# Patient Record
Sex: FEMALE | Race: BLACK OR AFRICAN AMERICAN | Employment: FULL TIME
[De-identification: names, ages, dates, MRNs, and addresses within clinical notes are randomized per-mention and may not be internally consistent; named-entity substitution may affect disease eponyms.]

---

## 2024-09-05 ENCOUNTER — OFFICE VISIT (OUTPATIENT)
Dept: INTERNAL MEDICINE CLINIC | Facility: CLINIC | Age: 44
End: 2024-09-05
Payer: COMMERCIAL

## 2024-09-05 VITALS
BODY MASS INDEX: 50.02 KG/M2 | TEMPERATURE: 97.6 F | SYSTOLIC BLOOD PRESSURE: 140 MMHG | OXYGEN SATURATION: 100 % | HEIGHT: 63 IN | HEART RATE: 74 BPM | WEIGHT: 282.3 LBS | DIASTOLIC BLOOD PRESSURE: 100 MMHG

## 2024-09-05 DIAGNOSIS — R06.83 SNORING: ICD-10-CM

## 2024-09-05 DIAGNOSIS — G43.909 MIGRAINE WITHOUT STATUS MIGRAINOSUS, NOT INTRACTABLE, UNSPECIFIED MIGRAINE TYPE: ICD-10-CM

## 2024-09-05 DIAGNOSIS — E66.01 CLASS 3 SEVERE OBESITY DUE TO EXCESS CALORIES WITH SERIOUS COMORBIDITY AND BODY MASS INDEX (BMI) OF 50.0 TO 59.9 IN ADULT (HCC): ICD-10-CM

## 2024-09-05 DIAGNOSIS — I10 PRIMARY HYPERTENSION: ICD-10-CM

## 2024-09-05 DIAGNOSIS — H53.8 BLURRED VISION: ICD-10-CM

## 2024-09-05 DIAGNOSIS — Z76.89 ENCOUNTER TO ESTABLISH CARE: Primary | ICD-10-CM

## 2024-09-05 PROCEDURE — 3078F DIAST BP <80 MM HG: CPT | Performed by: NURSE PRACTITIONER

## 2024-09-05 PROCEDURE — 99204 OFFICE O/P NEW MOD 45 MIN: CPT | Performed by: NURSE PRACTITIONER

## 2024-09-05 PROCEDURE — 3077F SYST BP >= 140 MM HG: CPT | Performed by: NURSE PRACTITIONER

## 2024-09-05 RX ORDER — ATENOLOL 25 MG/1
25 TABLET ORAL DAILY
Qty: 90 TABLET | Refills: 0 | Status: SHIPPED | OUTPATIENT
Start: 2024-09-05

## 2024-09-05 RX ORDER — BUTALBITAL, ACETAMINOPHEN AND CAFFEINE 50; 325; 40 MG/1; MG/1; MG/1
1 TABLET ORAL EVERY 4 HOURS PRN
Qty: 30 TABLET | Refills: 0 | Status: SHIPPED | OUTPATIENT
Start: 2024-09-05

## 2024-09-05 SDOH — ECONOMIC STABILITY: FOOD INSECURITY: WITHIN THE PAST 12 MONTHS, YOU WORRIED THAT YOUR FOOD WOULD RUN OUT BEFORE YOU GOT MONEY TO BUY MORE.: NEVER TRUE

## 2024-09-05 SDOH — ECONOMIC STABILITY: INCOME INSECURITY: HOW HARD IS IT FOR YOU TO PAY FOR THE VERY BASICS LIKE FOOD, HOUSING, MEDICAL CARE, AND HEATING?: NOT VERY HARD

## 2024-09-05 SDOH — ECONOMIC STABILITY: FOOD INSECURITY: WITHIN THE PAST 12 MONTHS, THE FOOD YOU BOUGHT JUST DIDN'T LAST AND YOU DIDN'T HAVE MONEY TO GET MORE.: NEVER TRUE

## 2024-09-05 ASSESSMENT — PATIENT HEALTH QUESTIONNAIRE - PHQ9
SUM OF ALL RESPONSES TO PHQ QUESTIONS 1-9: 0
1. LITTLE INTEREST OR PLEASURE IN DOING THINGS: NOT AT ALL
SUM OF ALL RESPONSES TO PHQ QUESTIONS 1-9: 0
SUM OF ALL RESPONSES TO PHQ9 QUESTIONS 1 & 2: 0
SUM OF ALL RESPONSES TO PHQ QUESTIONS 1-9: 0
2. FEELING DOWN, DEPRESSED OR HOPELESS: NOT AT ALL
SUM OF ALL RESPONSES TO PHQ QUESTIONS 1-9: 0

## 2024-09-05 ASSESSMENT — ENCOUNTER SYMPTOMS
EYE PAIN: 0
VOMITING: 0
COUGH: 0
NAUSEA: 0
SORE THROAT: 0
SHORTNESS OF BREATH: 0
SINUS PAIN: 0
ABDOMINAL PAIN: 0
BACK PAIN: 0
RHINORRHEA: 0
CONSTIPATION: 0
DIARRHEA: 0

## 2024-09-05 NOTE — PROGRESS NOTES
Management     Referral Priority:   Routine     Referral Type:   Eval and Treat     Referral Reason:   Specialty Services Required     Number of Visits Requested:   1    [facility]     Referral Priority:   Routine     Referral Type:   Eval and Treat     Referral Reason:   Specialty Services Required     Referral Location:   [facility]     Requested Specialty:   Ophthalmology     Number of Visits Requested:   1                  Follow Up  Return in about 4 days (around 9/9/2024), or if symptoms worsen or fail to improve, for Physical with fasting labs.             Riana Moore DNP, FNP-BC

## 2024-09-09 ENCOUNTER — OFFICE VISIT (OUTPATIENT)
Dept: INTERNAL MEDICINE CLINIC | Facility: CLINIC | Age: 44
End: 2024-09-09
Payer: COMMERCIAL

## 2024-09-09 VITALS
SYSTOLIC BLOOD PRESSURE: 134 MMHG | HEIGHT: 63 IN | WEIGHT: 280 LBS | DIASTOLIC BLOOD PRESSURE: 89 MMHG | BODY MASS INDEX: 49.61 KG/M2 | HEART RATE: 87 BPM | OXYGEN SATURATION: 100 % | TEMPERATURE: 98.2 F

## 2024-09-09 DIAGNOSIS — J30.9 ALLERGIC RHINITIS, UNSPECIFIED SEASONALITY, UNSPECIFIED TRIGGER: ICD-10-CM

## 2024-09-09 DIAGNOSIS — Z00.00 ROUTINE GENERAL MEDICAL EXAMINATION AT A HEALTH CARE FACILITY: Primary | ICD-10-CM

## 2024-09-09 DIAGNOSIS — I10 PRIMARY HYPERTENSION: ICD-10-CM

## 2024-09-09 DIAGNOSIS — Z12.31 ENCOUNTER FOR SCREENING MAMMOGRAM FOR MALIGNANT NEOPLASM OF BREAST: ICD-10-CM

## 2024-09-09 DIAGNOSIS — G43.909 MIGRAINE WITHOUT STATUS MIGRAINOSUS, NOT INTRACTABLE, UNSPECIFIED MIGRAINE TYPE: ICD-10-CM

## 2024-09-09 DIAGNOSIS — E66.01 CLASS 3 SEVERE OBESITY DUE TO EXCESS CALORIES WITH SERIOUS COMORBIDITY AND BODY MASS INDEX (BMI) OF 45.0 TO 49.9 IN ADULT (HCC): ICD-10-CM

## 2024-09-09 LAB
ALBUMIN SERPL-MCNC: 3.5 G/DL (ref 3.5–5)
ALBUMIN/GLOB SERPL: 0.8 (ref 1–1.9)
ALP SERPL-CCNC: 100 U/L (ref 35–104)
ALT SERPL-CCNC: 13 U/L (ref 12–65)
ANION GAP SERPL CALC-SCNC: 10 MMOL/L (ref 9–18)
APPEARANCE UR: CLEAR
AST SERPL-CCNC: 22 U/L (ref 15–37)
BASOPHILS # BLD: 0 K/UL (ref 0–0.2)
BASOPHILS NFR BLD: 1 % (ref 0–2)
BILIRUB SERPL-MCNC: 0.5 MG/DL (ref 0–1.2)
BILIRUB UR QL: NEGATIVE
BUN SERPL-MCNC: 4 MG/DL (ref 6–23)
CALCIUM SERPL-MCNC: 9.3 MG/DL (ref 8.8–10.2)
CHLORIDE SERPL-SCNC: 102 MMOL/L (ref 98–107)
CHOLEST SERPL-MCNC: 175 MG/DL (ref 0–200)
CO2 SERPL-SCNC: 26 MMOL/L (ref 20–28)
COLOR UR: NORMAL
CREAT SERPL-MCNC: 0.6 MG/DL (ref 0.6–1.1)
DIFFERENTIAL METHOD BLD: ABNORMAL
EOSINOPHIL # BLD: 0.1 K/UL (ref 0–0.8)
EOSINOPHIL NFR BLD: 2 % (ref 0.5–7.8)
ERYTHROCYTE [DISTWIDTH] IN BLOOD BY AUTOMATED COUNT: 19.3 % (ref 11.9–14.6)
GLOBULIN SER CALC-MCNC: 4.2 G/DL (ref 2.3–3.5)
GLUCOSE SERPL-MCNC: 92 MG/DL (ref 70–99)
GLUCOSE UR STRIP.AUTO-MCNC: NEGATIVE MG/DL
HCT VFR BLD AUTO: 31.1 % (ref 35.8–46.3)
HDLC SERPL-MCNC: 45 MG/DL (ref 40–60)
HDLC SERPL: 3.9 (ref 0–5)
HGB BLD-MCNC: 8.4 G/DL (ref 11.7–15.4)
HGB UR QL STRIP: NEGATIVE
IMM GRANULOCYTES # BLD AUTO: 0 K/UL (ref 0–0.5)
IMM GRANULOCYTES NFR BLD AUTO: 0 % (ref 0–5)
KETONES UR QL STRIP.AUTO: NEGATIVE MG/DL
LDLC SERPL CALC-MCNC: 112 MG/DL (ref 0–100)
LEUKOCYTE ESTERASE UR QL STRIP.AUTO: NEGATIVE
LYMPHOCYTES # BLD: 3.8 K/UL (ref 0.5–4.6)
LYMPHOCYTES NFR BLD: 47 % (ref 13–44)
MCH RBC QN AUTO: 18.7 PG (ref 26.1–32.9)
MCHC RBC AUTO-ENTMCNC: 27 G/DL (ref 31.4–35)
MCV RBC AUTO: 69.3 FL (ref 82–102)
MONOCYTES # BLD: 0.5 K/UL (ref 0.1–1.3)
MONOCYTES NFR BLD: 6 % (ref 4–12)
NEUTS SEG # BLD: 3.6 K/UL (ref 1.7–8.2)
NEUTS SEG NFR BLD: 45 % (ref 43–78)
NITRITE UR QL STRIP.AUTO: NEGATIVE
NRBC # BLD: 0 K/UL (ref 0–0.2)
PH UR STRIP: 6.5 (ref 5–9)
PLATELET # BLD AUTO: 433 K/UL (ref 150–450)
PMV BLD AUTO: 9.4 FL (ref 9.4–12.3)
POTASSIUM SERPL-SCNC: 3.8 MMOL/L (ref 3.5–5.1)
PROT SERPL-MCNC: 7.7 G/DL (ref 6.3–8.2)
PROT UR STRIP-MCNC: NEGATIVE MG/DL
RBC # BLD AUTO: 4.49 M/UL (ref 4.05–5.2)
SODIUM SERPL-SCNC: 138 MMOL/L (ref 136–145)
SP GR UR REFRACTOMETRY: 1.01 (ref 1–1.02)
TRIGL SERPL-MCNC: 94 MG/DL (ref 0–150)
TSH, 3RD GENERATION: 2.98 UIU/ML (ref 0.27–4.2)
UROBILINOGEN UR QL STRIP.AUTO: 0.2 EU/DL (ref 0.2–1)
VLDLC SERPL CALC-MCNC: 19 MG/DL (ref 6–23)
WBC # BLD AUTO: 8 K/UL (ref 4.3–11.1)

## 2024-09-09 PROCEDURE — 3079F DIAST BP 80-89 MM HG: CPT | Performed by: NURSE PRACTITIONER

## 2024-09-09 PROCEDURE — 99396 PREV VISIT EST AGE 40-64: CPT | Performed by: NURSE PRACTITIONER

## 2024-09-09 PROCEDURE — 3075F SYST BP GE 130 - 139MM HG: CPT | Performed by: NURSE PRACTITIONER

## 2024-09-09 RX ORDER — FLUTICASONE PROPIONATE 50 MCG
1 SPRAY, SUSPENSION (ML) NASAL DAILY PRN
Qty: 16 G | Refills: 2 | Status: SHIPPED | OUTPATIENT
Start: 2024-09-09

## 2024-09-09 ASSESSMENT — ENCOUNTER SYMPTOMS
SORE THROAT: 0
ABDOMINAL PAIN: 0
RHINORRHEA: 0
COUGH: 0
CONSTIPATION: 0
BACK PAIN: 0
EYE PAIN: 0
VOMITING: 0
DIARRHEA: 0
SHORTNESS OF BREATH: 0
SINUS PAIN: 0
NAUSEA: 0

## 2024-09-10 DIAGNOSIS — D64.9 ANEMIA, UNSPECIFIED TYPE: Primary | ICD-10-CM

## 2024-09-10 RX ORDER — DOCUSATE SODIUM 100 MG/1
100 CAPSULE, LIQUID FILLED ORAL DAILY PRN
Qty: 90 CAPSULE | Refills: 1 | Status: SHIPPED | OUTPATIENT
Start: 2024-09-10

## 2024-09-10 RX ORDER — FERROUS SULFATE 325(65) MG
325 TABLET ORAL DAILY
Qty: 90 TABLET | Refills: 1 | Status: SHIPPED | OUTPATIENT
Start: 2024-09-10

## 2024-09-17 ENCOUNTER — TELEPHONE (OUTPATIENT)
Dept: INTERNAL MEDICINE CLINIC | Facility: CLINIC | Age: 44
End: 2024-09-17

## 2024-10-09 ENCOUNTER — OFFICE VISIT (OUTPATIENT)
Dept: INTERNAL MEDICINE CLINIC | Facility: CLINIC | Age: 44
End: 2024-10-09
Payer: COMMERCIAL

## 2024-10-09 VITALS
SYSTOLIC BLOOD PRESSURE: 132 MMHG | HEIGHT: 63 IN | BODY MASS INDEX: 49.2 KG/M2 | HEART RATE: 98 BPM | OXYGEN SATURATION: 100 % | WEIGHT: 277.7 LBS | TEMPERATURE: 97.3 F | DIASTOLIC BLOOD PRESSURE: 87 MMHG

## 2024-10-09 DIAGNOSIS — G43.909 MIGRAINE WITHOUT STATUS MIGRAINOSUS, NOT INTRACTABLE, UNSPECIFIED MIGRAINE TYPE: Primary | ICD-10-CM

## 2024-10-09 DIAGNOSIS — R11.0 NAUSEA: ICD-10-CM

## 2024-10-09 PROCEDURE — 99214 OFFICE O/P EST MOD 30 MIN: CPT | Performed by: NURSE PRACTITIONER

## 2024-10-09 PROCEDURE — 3075F SYST BP GE 130 - 139MM HG: CPT | Performed by: NURSE PRACTITIONER

## 2024-10-09 PROCEDURE — 3079F DIAST BP 80-89 MM HG: CPT | Performed by: NURSE PRACTITIONER

## 2024-10-09 RX ORDER — SUMATRIPTAN 50 MG/1
50 TABLET, FILM COATED ORAL DAILY PRN
Qty: 10 TABLET | Refills: 0 | Status: SHIPPED | OUTPATIENT
Start: 2024-10-09

## 2024-10-09 RX ORDER — ONDANSETRON 4 MG/1
4-8 TABLET, ORALLY DISINTEGRATING ORAL 3 TIMES DAILY PRN
COMMUNITY
Start: 2024-10-02 | End: 2024-10-09 | Stop reason: SDUPTHER

## 2024-10-09 RX ORDER — ONDANSETRON 4 MG/1
4-8 TABLET, ORALLY DISINTEGRATING ORAL EVERY 8 HOURS PRN
Qty: 30 TABLET | Refills: 1 | Status: SHIPPED | OUTPATIENT
Start: 2024-10-09

## 2024-10-09 RX ORDER — ERENUMAB-AOOE 70 MG/ML
70 INJECTION SUBCUTANEOUS
Qty: 1 ADJUSTABLE DOSE PRE-FILLED PEN SYRINGE | Refills: 2 | Status: SHIPPED | OUTPATIENT
Start: 2024-10-09

## 2024-10-09 RX ORDER — BUTALBITAL, ACETAMINOPHEN AND CAFFEINE 50; 325; 40 MG/1; MG/1; MG/1
1 TABLET ORAL EVERY 4 HOURS PRN
Qty: 30 TABLET | Refills: 0 | Status: SHIPPED | OUTPATIENT
Start: 2024-10-09

## 2024-10-09 RX ORDER — ACETAMINOPHEN AND CODEINE PHOSPHATE 300; 30 MG/1; MG/1
1-2 TABLET ORAL EVERY 6 HOURS PRN
COMMUNITY
Start: 2024-10-02

## 2024-10-09 ASSESSMENT — ENCOUNTER SYMPTOMS
ABDOMINAL PAIN: 0
COUGH: 0
CONSTIPATION: 0
SINUS PAIN: 0
SHORTNESS OF BREATH: 0
BACK PAIN: 0
DIARRHEA: 0
EYE PAIN: 0
SORE THROAT: 0
RHINORRHEA: 0
NAUSEA: 1
VOMITING: 0

## 2024-10-09 NOTE — PROGRESS NOTES
[facility]  [address]  [facility] [address]  Tel# [phone]  Fax# [phone]     Riana Moore DNP, FNP-BC  Family Nurse Practitioner            Date of Visit: 10/09/2024     Joanne May (: 1980) is a 44 y.o. female  established patient, here for evaluation of the following chief complaint(s):    Chief Complaint   Patient presents with    Migraine     Patient is still c/o migraines. Patient states they have been more intense since 24.         Patient Care Team:  Riana Moore APRN - CNP as PCP - General (Nurse Practitioner Family)  Riana Moore APRN - CNP as PCP - Empaneled Provider         History of Present Illness        Presents here for migraine follow up/urgent care follow up.          Migraines  Chronic  Started years ago around , about 14 years ago.     Had stopped going to [employer] in .  States she was working from home in  to .  Moved to [place] 2022 to Aug 2, 2022.  Moved to [place] Aug 3, 2024.     For the last 4 years, gets migraine headache once a month, duration 4 hours, resolved with ibuprofen.     Started having headache frequency since 2024 -- 2-3x a month, lasting 3-4 days, takes ibuprofen.  Denies going to an urgent care.      Noticed blurred vision since 2024. Denies going to an eye doctor or urgent care.      Would like to have [facility] for work re: migraines, blurred vision in the future. States she lives in [place], [place]  And still works at [place], [place], driving 2.5-3 hrs to/from work.          10/9/2024  Pt was seen at an urgent care on 10/2/2024 - [facility].  Pt states that Tylenol with Codeine helps with her headache, but makes her drowsy.    Pt states \"the headache does not go away\". Describes as \"manageable dull pain\".  Pt states on 2024, she was in her drive way in her car. Pt states she still a house in [place] (until 2024).  States \"everything went black\"

## 2024-11-06 ENCOUNTER — OFFICE VISIT (OUTPATIENT)
Dept: INTERNAL MEDICINE CLINIC | Facility: CLINIC | Age: 44
End: 2024-11-06
Payer: COMMERCIAL

## 2024-11-06 VITALS
DIASTOLIC BLOOD PRESSURE: 84 MMHG | OXYGEN SATURATION: 100 % | SYSTOLIC BLOOD PRESSURE: 137 MMHG | BODY MASS INDEX: 49.24 KG/M2 | TEMPERATURE: 98 F | WEIGHT: 277.9 LBS | HEART RATE: 88 BPM | HEIGHT: 63 IN

## 2024-11-06 DIAGNOSIS — E66.01 OBESITY, CLASS III, BMI 40-49.9 (MORBID OBESITY): ICD-10-CM

## 2024-11-06 DIAGNOSIS — R11.0 NAUSEA: ICD-10-CM

## 2024-11-06 DIAGNOSIS — R23.2 HOT FLASHES: ICD-10-CM

## 2024-11-06 DIAGNOSIS — G43.909 MIGRAINE WITHOUT STATUS MIGRAINOSUS, NOT INTRACTABLE, UNSPECIFIED MIGRAINE TYPE: Primary | ICD-10-CM

## 2024-11-06 DIAGNOSIS — N92.0 MENORRHAGIA WITH REGULAR CYCLE: ICD-10-CM

## 2024-11-06 DIAGNOSIS — I10 PRIMARY HYPERTENSION: ICD-10-CM

## 2024-11-06 PROCEDURE — 3079F DIAST BP 80-89 MM HG: CPT | Performed by: NURSE PRACTITIONER

## 2024-11-06 PROCEDURE — 99214 OFFICE O/P EST MOD 30 MIN: CPT | Performed by: NURSE PRACTITIONER

## 2024-11-06 PROCEDURE — 3075F SYST BP GE 130 - 139MM HG: CPT | Performed by: NURSE PRACTITIONER

## 2024-11-06 RX ORDER — BUTALBITAL, ACETAMINOPHEN AND CAFFEINE 50; 325; 40 MG/1; MG/1; MG/1
1 TABLET ORAL EVERY 4 HOURS PRN
Qty: 30 TABLET | Refills: 0 | Status: SHIPPED | OUTPATIENT
Start: 2024-11-06

## 2024-11-06 RX ORDER — ONDANSETRON 4 MG/1
4-8 TABLET, ORALLY DISINTEGRATING ORAL EVERY 8 HOURS PRN
Qty: 30 TABLET | Refills: 1 | Status: SHIPPED | OUTPATIENT
Start: 2024-11-06

## 2024-11-06 RX ORDER — TOPIRAMATE 25 MG/1
25 TABLET, FILM COATED ORAL NIGHTLY
Qty: 7 TABLET | Refills: 0 | Status: SHIPPED | OUTPATIENT
Start: 2024-11-06 | End: 2024-11-13

## 2024-11-06 RX ORDER — ATENOLOL 25 MG/1
25 TABLET ORAL DAILY
Qty: 90 TABLET | Refills: 0 | Status: SHIPPED | OUTPATIENT
Start: 2024-11-06

## 2024-11-06 RX ORDER — SUMATRIPTAN 50 MG/1
50 TABLET, FILM COATED ORAL DAILY PRN
Qty: 10 TABLET | Refills: 0 | Status: SHIPPED | OUTPATIENT
Start: 2024-11-06

## 2024-11-06 RX ORDER — TOPIRAMATE 25 MG/1
25 TABLET, FILM COATED ORAL 2 TIMES DAILY
Qty: 60 TABLET | Refills: 0 | Status: SHIPPED | OUTPATIENT
Start: 2024-11-14 | End: 2024-12-14

## 2024-11-06 ASSESSMENT — ENCOUNTER SYMPTOMS
EYE PAIN: 0
BACK PAIN: 0
SHORTNESS OF BREATH: 0
DIARRHEA: 0
SORE THROAT: 0
COUGH: 0
SINUS PAIN: 0
ABDOMINAL PAIN: 0
VOMITING: 0
NAUSEA: 1
CONSTIPATION: 0
RHINORRHEA: 0

## 2024-11-06 NOTE — PROGRESS NOTES
sounds: Normal breath sounds.   Abdominal:      General: Bowel sounds are normal.      Palpations: Abdomen is soft.   Musculoskeletal:         General: Normal range of motion.      Cervical back: Neck supple.   Skin:     General: Skin is warm and dry.   Neurological:      General: No focal deficit present.      Mental Status: She is alert and oriented to person, place, and time.   Psychiatric:         Mood and Affect: Mood normal.         Thought Content: Thought content normal.              Assessment/Plan:          ICD-10-CM    1. Migraine without status migrainosus, not intractable, unspecified migraine type  G43.909 atenolol (TENORMIN) 25 MG tablet     butalbital-acetaminophen-caffeine (FIORICET, ESGIC) -40 MG per tablet     SUMAtriptan (IMITREX) 50 MG tablet     topiramate (TOPAMAX) 25 MG tablet     topiramate (TOPAMAX) 25 MG tablet      2. Primary hypertension  I10 atenolol (TENORMIN) 25 MG tablet      3. Nausea  R11.0 ondansetron (ZOFRAN-ODT) 4 MG disintegrating tablet      4. Hot flashes  R23.2 [facility]      5. Menorrhagia with regular cycle  N92.0 [facility]      6. Obesity, Class III, BMI 40-49.9 (morbid obesity)  E66.01                Joanne was seen today for migraine.    Diagnoses and all orders for this visit:    Migraine without status migrainosus, not intractable, unspecified migraine type  -     atenolol (TENORMIN) 25 MG tablet; Take 1 tablet by mouth daily  -     butalbital-acetaminophen-caffeine (FIORICET, ESGIC) -40 MG per tablet; Take 1 tablet by mouth every 4 hours as needed for Headaches or Migraine  -     SUMAtriptan (IMITREX) 50 MG tablet; Take 1 tablet by mouth daily as needed for Migraine  -     topiramate (TOPAMAX) 25 MG tablet; Take 1 tablet by mouth nightly for 7 days  -     topiramate (TOPAMAX) 25 MG tablet; Take 1 tablet by mouth 2 times daily    Primary hypertension  -     atenolol (TENORMIN) 25 MG

## 2024-11-08 ENCOUNTER — TELEPHONE (OUTPATIENT)
Dept: INTERNAL MEDICINE CLINIC | Facility: CLINIC | Age: 44
End: 2024-11-08

## 2024-11-08 NOTE — TELEPHONE ENCOUNTER
The [facility] sent a fax to request the office notes from the patient's 11-6-24 visit.  Notes were faxed 11-8-24 to [phone]

## 2024-11-19 DIAGNOSIS — D64.9 ANEMIA, UNSPECIFIED TYPE: ICD-10-CM

## 2024-11-19 DIAGNOSIS — G43.909 MIGRAINE WITHOUT STATUS MIGRAINOSUS, NOT INTRACTABLE, UNSPECIFIED MIGRAINE TYPE: ICD-10-CM

## 2024-11-19 DIAGNOSIS — J30.9 ALLERGIC RHINITIS, UNSPECIFIED SEASONALITY, UNSPECIFIED TRIGGER: ICD-10-CM

## 2024-11-19 RX ORDER — FLUTICASONE PROPIONATE 50 MCG
1 SPRAY, SUSPENSION (ML) NASAL DAILY PRN
Qty: 16 G | Refills: 2 | OUTPATIENT
Start: 2024-11-19

## 2024-11-22 ENCOUNTER — OFFICE VISIT (OUTPATIENT)
Dept: ONCOLOGY | Age: 44
End: 2024-11-22
Payer: COMMERCIAL

## 2024-11-22 ENCOUNTER — HOSPITAL ENCOUNTER (OUTPATIENT)
Dept: LAB | Age: 44
Discharge: HOME | End: 2024-11-22
Payer: COMMERCIAL

## 2024-11-22 VITALS
DIASTOLIC BLOOD PRESSURE: 89 MMHG | TEMPERATURE: 98.1 F | HEART RATE: 92 BPM | BODY MASS INDEX: 48.73 KG/M2 | HEIGHT: 63 IN | SYSTOLIC BLOOD PRESSURE: 132 MMHG | WEIGHT: 275 LBS | RESPIRATION RATE: 17 BRPM | OXYGEN SATURATION: 96 %

## 2024-11-22 DIAGNOSIS — D64.9 ANEMIA, UNSPECIFIED TYPE: ICD-10-CM

## 2024-11-22 DIAGNOSIS — D64.9 ANEMIA, UNSPECIFIED TYPE: Primary | ICD-10-CM

## 2024-11-22 LAB
ALBUMIN SERPL-MCNC: 3.7 G/DL (ref 3.5–5)
ALBUMIN/GLOB SERPL: 0.7 (ref 1–1.9)
ALP SERPL-CCNC: 122 U/L (ref 35–104)
ALT SERPL-CCNC: 13 U/L (ref 8–45)
ANION GAP SERPL CALC-SCNC: 10 MMOL/L (ref 7–16)
AST SERPL-CCNC: 19 U/L (ref 15–37)
BASOPHILS # BLD: 0 K/UL (ref 0–0.2)
BASOPHILS NFR BLD: 0 % (ref 0–2)
BILIRUB SERPL-MCNC: 0.4 MG/DL (ref 0–1.2)
BUN SERPL-MCNC: 4 MG/DL (ref 6–23)
CALCIUM SERPL-MCNC: 9.2 MG/DL (ref 8.8–10.2)
CHLORIDE SERPL-SCNC: 103 MMOL/L (ref 98–107)
CO2 SERPL-SCNC: 24 MMOL/L (ref 20–29)
CREAT SERPL-MCNC: 0.63 MG/DL (ref 0.6–1.1)
DIFFERENTIAL METHOD BLD: ABNORMAL
EOSINOPHIL # BLD: 0.2 K/UL (ref 0–0.8)
EOSINOPHIL NFR BLD: 2 % (ref 0.5–7.8)
ERYTHROCYTE [DISTWIDTH] IN BLOOD BY AUTOMATED COUNT: 19.1 % (ref 11.9–14.6)
FERRITIN SERPL-MCNC: 13 NG/ML (ref 8–388)
FOLATE SERPL-MCNC: 10.9 NG/ML (ref 3.1–17.5)
GLOBULIN SER CALC-MCNC: 5 G/DL (ref 2.3–3.5)
GLUCOSE SERPL-MCNC: 104 MG/DL (ref 70–99)
HCT VFR BLD AUTO: 34.2 % (ref 35.8–46.3)
HGB BLD-MCNC: 9.7 G/DL (ref 11.7–15.4)
HGB RETIC QN AUTO: 21 PG (ref 29–35)
IMM GRANULOCYTES # BLD AUTO: 0 K/UL (ref 0–0.5)
IMM GRANULOCYTES NFR BLD AUTO: 0 % (ref 0–5)
IMM RETICS NFR: 20 % (ref 3–15.9)
IRON SATN MFR SERPL: 6 % (ref 20–50)
IRON SERPL-MCNC: 23 UG/DL (ref 35–100)
LYMPHOCYTES # BLD: 3.6 K/UL (ref 0.5–4.6)
LYMPHOCYTES NFR BLD: 40 % (ref 13–44)
MCH RBC QN AUTO: 19.4 PG (ref 26.1–32.9)
MCHC RBC AUTO-ENTMCNC: 28.4 G/DL (ref 31.4–35)
MCV RBC AUTO: 68.3 FL (ref 82–102)
MONOCYTES # BLD: 0.6 K/UL (ref 0.1–1.3)
MONOCYTES NFR BLD: 7 % (ref 4–12)
NEUTS SEG # BLD: 4.6 K/UL (ref 1.7–8.2)
NEUTS SEG NFR BLD: 51 % (ref 43–78)
NRBC # BLD: 0 K/UL (ref 0–0.2)
PATH REV BLD -IMP: NORMAL
PLATELET # BLD AUTO: 409 K/UL (ref 150–450)
PLATELET COMMENT: ADEQUATE
PMV BLD AUTO: 9.2 FL (ref 9.4–12.3)
POTASSIUM SERPL-SCNC: 4.1 MMOL/L (ref 3.5–5.1)
PROT SERPL-MCNC: 8.6 G/DL (ref 6.3–8.2)
RBC # BLD AUTO: 5.01 M/UL (ref 4.05–5.2)
RBC MORPH BLD: ABNORMAL
RETICS # AUTO: 0.05 M/UL (ref 0.03–0.1)
RETICS/RBC NFR AUTO: 1.1 % (ref 0.3–2)
SODIUM SERPL-SCNC: 137 MMOL/L (ref 136–145)
TIBC SERPL-MCNC: 357 UG/DL (ref 240–450)
UIBC SERPL-MCNC: 334 UG/DL (ref 112–347)
VIT B12 SERPL-MCNC: 404 PG/ML (ref 193–986)
WBC # BLD AUTO: 9 K/UL (ref 4.3–11.1)
WBC MORPH BLD: ABNORMAL

## 2024-11-22 PROCEDURE — 85046 RETICYTE/HGB CONCENTRATE: CPT

## 2024-11-22 PROCEDURE — 83550 IRON BINDING TEST: CPT

## 2024-11-22 PROCEDURE — 82746 ASSAY OF FOLIC ACID SERUM: CPT

## 2024-11-22 PROCEDURE — 3079F DIAST BP 80-89 MM HG: CPT | Performed by: STUDENT IN AN ORGANIZED HEALTH CARE EDUCATION/TRAINING PROGRAM

## 2024-11-22 PROCEDURE — 83540 ASSAY OF IRON: CPT

## 2024-11-22 PROCEDURE — 99203 OFFICE O/P NEW LOW 30 MIN: CPT | Performed by: STUDENT IN AN ORGANIZED HEALTH CARE EDUCATION/TRAINING PROGRAM

## 2024-11-22 PROCEDURE — 82607 VITAMIN B-12: CPT

## 2024-11-22 PROCEDURE — 3075F SYST BP GE 130 - 139MM HG: CPT | Performed by: STUDENT IN AN ORGANIZED HEALTH CARE EDUCATION/TRAINING PROGRAM

## 2024-11-22 PROCEDURE — 80053 COMPREHEN METABOLIC PANEL: CPT

## 2024-11-22 PROCEDURE — 36415 COLL VENOUS BLD VENIPUNCTURE: CPT

## 2024-11-22 PROCEDURE — 85025 COMPLETE CBC W/AUTO DIFF WBC: CPT

## 2024-11-22 PROCEDURE — 82728 ASSAY OF FERRITIN: CPT

## 2024-11-22 RX ORDER — IRON FUM,PS CMP/VIT C/NIACIN 125-40-3MG
1 CAPSULE ORAL EVERY OTHER DAY
Qty: 45 CAPSULE | Refills: 0 | Status: SHIPPED | OUTPATIENT
Start: 2024-11-22

## 2024-11-22 ASSESSMENT — PATIENT HEALTH QUESTIONNAIRE - PHQ9
SUM OF ALL RESPONSES TO PHQ QUESTIONS 1-9: 0
2. FEELING DOWN, DEPRESSED OR HOPELESS: NOT AT ALL
SUM OF ALL RESPONSES TO PHQ QUESTIONS 1-9: 0
1. LITTLE INTEREST OR PLEASURE IN DOING THINGS: NOT AT ALL
SUM OF ALL RESPONSES TO PHQ QUESTIONS 1-9: 0
SUM OF ALL RESPONSES TO PHQ QUESTIONS 1-9: 0
SUM OF ALL RESPONSES TO PHQ9 QUESTIONS 1 & 2: 0

## 2024-11-22 NOTE — PROGRESS NOTES
[facility]: Office Visit New Patient H & P    Chief Complaint:    Chief Complaint   Patient presents with    New Patient         History of Present Illness:  Ms. May is a 44 y.o. female who referred to me for evaluation of anemia.    She complains of fatigue.  Intermittent heart palpitations since August.  Denies any chest pain, dyspnea.  Has very heavy menses which last for 5 days and uses 6-8 large pads a day. Passes clot.   Has appointment with gynecologist on .  Denies gastric bypass surgeries.  Diet okay  Denies rheumatological diseases.  Not on oral iron as her insurance did not approve.    Medical history significant for obesity, migraine with blurry vision, HLD, hypertension.    Labs reviewed:  -Laboratory results showed HGB 8.4, MCV 69.3, MCH 18.7, MCHC 27.0, elevated RDW 19.3, normal white blood cell and platelet count.  Urinalysis negative for blood.  TSH WNL , normal renal function         Review of Systems:  10 point ROS is negative except as mentioned above.      No Known Allergies  Past Medical History:   Diagnosis Date    Elevated BP without diagnosis of hypertension     Hyperlipidemia     Migraine     Obesity     Preeclampsia      Past Surgical History:   Procedure Laterality Date     SECTION       Family History   Problem Relation Age of Onset    Mult Sclerosis Mother     High Blood Pressure Mother     Other Mother         Multiple Sclerosis    No Known Problems Father     Cancer Maternal Grandmother     Cancer Maternal Uncle      Social History     Socioeconomic History    Marital status:      Spouse name: Not on file    Number of children: Not on file    Years of education: Not on file    Highest education level: Not on file   Occupational History    Not on file   Tobacco Use    Smoking status: Never    Smokeless tobacco: Never   Vaping Use    Vaping status: Never Used   Substance and Sexual Activity    Alcohol use: Never    Drug use: Never

## 2024-11-22 NOTE — PATIENT INSTRUCTIONS
Patient Information from Today's Visit    Diagnosis: Anemia      Follow Up Instructions: We will have blood work done today and call you if there are any abnormal results that require an action. Some lab testing can take 7-10 days or longer to get results.       Treatment Summary has been discussed and given to patient: N/A      Current Labs: NA      Please refer to After Visit Summary or [email or web address] for upcoming appointment information. If you have any questions regarding your upcoming schedule please reach out to your care team through [employer] or call [phone].    Please notify your assigned Nurse Navigator of any unplanned hospital admissions or Emergency Room visits within 24 hours of discharge.    -------------------------------------------------------------------------------------------------------------------  Please call our office at [phone] if you have any  of the following symptoms:   Fever of 100.5 or greater  Chills  Shortness of breath  Swelling or pain in one leg    After office hours an answering service is available and will contact a provider for emergencies or if you are experiencing any of the above symptoms.    MONICA KOCH RN

## 2024-11-25 ENCOUNTER — OFFICE VISIT (OUTPATIENT)
Dept: INTERNAL MEDICINE CLINIC | Facility: CLINIC | Age: 44
End: 2024-11-25
Payer: COMMERCIAL

## 2024-11-25 VITALS
SYSTOLIC BLOOD PRESSURE: 139 MMHG | TEMPERATURE: 97.3 F | HEIGHT: 64 IN | HEART RATE: 100 BPM | BODY MASS INDEX: 47.29 KG/M2 | DIASTOLIC BLOOD PRESSURE: 86 MMHG | OXYGEN SATURATION: 100 % | WEIGHT: 277 LBS

## 2024-11-25 DIAGNOSIS — G43.909 MIGRAINE WITHOUT STATUS MIGRAINOSUS, NOT INTRACTABLE, UNSPECIFIED MIGRAINE TYPE: Primary | ICD-10-CM

## 2024-11-25 DIAGNOSIS — N92.0 MENORRHAGIA WITH REGULAR CYCLE: ICD-10-CM

## 2024-11-25 DIAGNOSIS — H53.8 BLURRED VISION: ICD-10-CM

## 2024-11-25 DIAGNOSIS — D64.9 ANEMIA, UNSPECIFIED TYPE: ICD-10-CM

## 2024-11-25 PROCEDURE — 3079F DIAST BP 80-89 MM HG: CPT | Performed by: NURSE PRACTITIONER

## 2024-11-25 PROCEDURE — 3075F SYST BP GE 130 - 139MM HG: CPT | Performed by: NURSE PRACTITIONER

## 2024-11-25 PROCEDURE — 99214 OFFICE O/P EST MOD 30 MIN: CPT | Performed by: NURSE PRACTITIONER

## 2024-11-25 RX ORDER — TOPIRAMATE 50 MG/1
50 TABLET, FILM COATED ORAL 2 TIMES DAILY
Qty: 60 TABLET | Refills: 1 | Status: SHIPPED | OUTPATIENT
Start: 2024-11-25

## 2024-11-25 ASSESSMENT — ENCOUNTER SYMPTOMS
SORE THROAT: 0
SHORTNESS OF BREATH: 0
VOMITING: 0
BACK PAIN: 0
COUGH: 0
RHINORRHEA: 0
SINUS PAIN: 0
ABDOMINAL PAIN: 0
DIARRHEA: 0
NAUSEA: 0
CONSTIPATION: 0
EYE PAIN: 0

## 2024-11-25 NOTE — PATIENT INSTRUCTIONS
Please arrive 20 minutes prior to your appointment time to allow time for checking in at the  and rooming with the medical assistant. Please bring your medication bottles at each visit.       [facility]  [phone] (OFFICE)  [phone] (FAX)  [email or web address]  [address] [place] [id number]

## 2024-11-25 NOTE — PROGRESS NOTES
[facility]  [address]  [facility] [address]  Tel# [phone]  Fax# [phone]     Riana Moore DNP, FNP-BC  Family Nurse Practitioner            Date of Visit: 2024     Joanne May (: 1980) is a 44 y.o. female  established patient, here for evaluation of the following chief complaint(s):    Chief Complaint   Patient presents with    Migraine     Patient is here to followup on migraine headaches and to discuss Topomax.    Discuss Medications         Patient Care Team:  Riana Moore, KASIA - CNP as PCP - General (Nurse Practitioner Family)  Riana Moore APRN - CNP as PCP - Empaneled Provider         History of Present Illness            Urgent Care Follow Up    Presents here for follow up on migraine headache. Pt seen at [facility] in [place] on 2024 for complaint of hives from Topamax. Pt states she is not sure if it's from Topamax or Fioricet generic.  Pt states she is still taking Topamax. Pt states it brings her pain down to a 6/10.  States \"it's still painful\".  Pt states last week, Sun evening to Wednesday, pain level 10/10, \"I couldn't even get up\".  Pt states she can't afford to keep going to urgent care and states the Short Term Disability company has not paid her claims. Pt states she has dull pain daily. Pt states she still has blurred vision. Pt states she had seen the eye doctor, was told she has astigmatism. Pt was prescribed reading glasses. States it hurts when she dubois her hair.  Pt states glasses help with reading her emails. Pt still has migraine pain. Pt states when she bends over, seeing \"little dots\".      2024  Pt sent [employer] message stating Fioricet generic does not work for her migraine.  Pt stated \"I’ve been experiencing side effects from the Butalbital-Acetaminophen, including hives, extreme nausea, and dizziness. As well as the medicine is not providing any relief for my migraines.\"    New patient neurology appt

## 2024-12-05 ENCOUNTER — OFFICE VISIT (OUTPATIENT)
Dept: INTERNAL MEDICINE CLINIC | Facility: CLINIC | Age: 44
End: 2024-12-05
Payer: COMMERCIAL

## 2024-12-05 VITALS
TEMPERATURE: 98 F | HEART RATE: 88 BPM | SYSTOLIC BLOOD PRESSURE: 137 MMHG | DIASTOLIC BLOOD PRESSURE: 88 MMHG | HEIGHT: 64 IN | WEIGHT: 283.5 LBS | OXYGEN SATURATION: 100 % | BODY MASS INDEX: 48.4 KG/M2

## 2024-12-05 DIAGNOSIS — G43.909 MIGRAINE WITHOUT STATUS MIGRAINOSUS, NOT INTRACTABLE, UNSPECIFIED MIGRAINE TYPE: Primary | ICD-10-CM

## 2024-12-05 DIAGNOSIS — D64.9 ANEMIA, UNSPECIFIED TYPE: ICD-10-CM

## 2024-12-05 DIAGNOSIS — I10 PRIMARY HYPERTENSION: ICD-10-CM

## 2024-12-05 DIAGNOSIS — F32.A ANXIETY AND DEPRESSION: ICD-10-CM

## 2024-12-05 DIAGNOSIS — F41.9 ANXIETY AND DEPRESSION: ICD-10-CM

## 2024-12-05 PROCEDURE — 99214 OFFICE O/P EST MOD 30 MIN: CPT | Performed by: NURSE PRACTITIONER

## 2024-12-05 PROCEDURE — 3075F SYST BP GE 130 - 139MM HG: CPT | Performed by: NURSE PRACTITIONER

## 2024-12-05 PROCEDURE — 3079F DIAST BP 80-89 MM HG: CPT | Performed by: NURSE PRACTITIONER

## 2024-12-05 RX ORDER — BUTALBITAL, ACETAMINOPHEN AND CAFFEINE 50; 325; 40 MG/1; MG/1; MG/1
1 TABLET ORAL EVERY 4 HOURS PRN
Qty: 30 TABLET | Refills: 0 | Status: SHIPPED | OUTPATIENT
Start: 2024-12-05

## 2024-12-05 ASSESSMENT — ENCOUNTER SYMPTOMS
SORE THROAT: 0
DIARRHEA: 0
VOMITING: 0
COUGH: 0
SHORTNESS OF BREATH: 0
EYE PAIN: 0
SINUS PAIN: 0
RHINORRHEA: 0
ABDOMINAL PAIN: 0
BACK PAIN: 0
NAUSEA: 1
CONSTIPATION: 0

## 2024-12-05 NOTE — PROGRESS NOTES
any improvement from medication, associated symptoms.  Advised to check back with pharmacy re: Aimovig.    Advised on whole foods, nutrient dense meals. Ensure to stay well hydrated, drink plenty of plain water (half of body weight in oz). Avoid or limit fast foods or processed foods. Advise to monitor and report any aggravating factors or aura. Advised to seek medical attention (go to ER/hospital) for any thunder clap headache or worsening headache.     Keep appt with specialists - hem/onc, obgyn, neurologist, ENT.      Unable to determine return to work at this time. Pt has next follow up appt with me on 12/12/2024.  Pt still awaiting to receive Nurtec and has upcoming appt for MRI 12/9/2024.          Orders Placed This Encounter   Procedures   • [facility] (Counsel[facility])     Referral Priority:   Routine     Referral Type:   Eval and Treat     Referral Reason:   Specialty Services Required     Referred to Provider:   Elroy Arceo LISW     Requested Specialty:   Licensed Clinical      Number of Visits Requested:   1   • [facility]     Referral Priority:   Routine     Referral Type:   Eval and Treat     Referral Reason:   Specialty Services Required     Requested Specialty:   Pain Management     Number of Visits Requested:   1                  Follow Up  Return in about 1 week (around 12/12/2024), or if symptoms worsen or fail to improve, for ROV (migraine).         Has appt with [facility] on 12/16/2024  Jose Samaniego,   General Internal Medicine  NPI: [id number]  [address] [facility] [address]  Phone: [phone]  Fax: [phone]              Riana Moore DNP, FNP-BC

## 2024-12-09 DIAGNOSIS — E23.6 EMPTY SELLA (HCC): Primary | ICD-10-CM

## 2024-12-11 ENCOUNTER — TELEPHONE (OUTPATIENT)
Dept: INTERNAL MEDICINE CLINIC | Facility: CLINIC | Age: 44
End: 2024-12-11

## 2024-12-11 DIAGNOSIS — G43.909 MIGRAINE WITHOUT STATUS MIGRAINOSUS, NOT INTRACTABLE, UNSPECIFIED MIGRAINE TYPE: Primary | ICD-10-CM

## 2024-12-11 RX ORDER — GALCANEZUMAB 120 MG/ML
120 INJECTION, SOLUTION SUBCUTANEOUS
Qty: 1.12 ML | Refills: 2 | Status: SHIPPED | OUTPATIENT
Start: 2024-12-11

## 2024-12-11 NOTE — TELEPHONE ENCOUNTER
Spoke to patient who stated the Aimovig was not approved by her insurance company back in October.   Patient also states the Nurtec that she started on 12-09-24 is not helping her migraines at all. Patient states her migraines are severe and she has blurry vision. Patient also stated her next appointment with [facility] is on 01-14-25. Per Riana Moore NP, I instructed the patient to go to the ED today. Patient states she will go to the ED this afternoon.  I called patient's pharmacy and inquired regarding the Aimovig and the pharmacist did state it was not appoved by her insurance company however Emgality is approved. I will notify Riana Moore NP.

## 2024-12-12 ENCOUNTER — OFFICE VISIT (OUTPATIENT)
Dept: INTERNAL MEDICINE CLINIC | Facility: CLINIC | Age: 44
End: 2024-12-12
Payer: COMMERCIAL

## 2024-12-12 VITALS
WEIGHT: 277.8 LBS | SYSTOLIC BLOOD PRESSURE: 138 MMHG | OXYGEN SATURATION: 100 % | TEMPERATURE: 98.4 F | BODY MASS INDEX: 47.43 KG/M2 | DIASTOLIC BLOOD PRESSURE: 80 MMHG | HEART RATE: 99 BPM | HEIGHT: 64 IN

## 2024-12-12 DIAGNOSIS — F32.A ACUTE DEPRESSION: ICD-10-CM

## 2024-12-12 DIAGNOSIS — H53.8 BLURRED VISION: ICD-10-CM

## 2024-12-12 DIAGNOSIS — G43.909 MIGRAINE WITHOUT STATUS MIGRAINOSUS, NOT INTRACTABLE, UNSPECIFIED MIGRAINE TYPE: Primary | ICD-10-CM

## 2024-12-12 PROCEDURE — 3079F DIAST BP 80-89 MM HG: CPT | Performed by: NURSE PRACTITIONER

## 2024-12-12 PROCEDURE — 3075F SYST BP GE 130 - 139MM HG: CPT | Performed by: NURSE PRACTITIONER

## 2024-12-12 PROCEDURE — 99214 OFFICE O/P EST MOD 30 MIN: CPT | Performed by: NURSE PRACTITIONER

## 2024-12-12 RX ORDER — VENLAFAXINE HYDROCHLORIDE 37.5 MG/1
37.5 CAPSULE, EXTENDED RELEASE ORAL DAILY
Qty: 30 CAPSULE | Refills: 0 | Status: SHIPPED | OUTPATIENT
Start: 2024-12-12 | End: 2024-12-12 | Stop reason: SINTOL

## 2024-12-12 ASSESSMENT — PATIENT HEALTH QUESTIONNAIRE - PHQ9
SUM OF ALL RESPONSES TO PHQ QUESTIONS 1-9: 3
9. THOUGHTS THAT YOU WOULD BE BETTER OFF DEAD, OR OF HURTING YOURSELF: NOT AT ALL
1. LITTLE INTEREST OR PLEASURE IN DOING THINGS: SEVERAL DAYS
8. MOVING OR SPEAKING SO SLOWLY THAT OTHER PEOPLE COULD HAVE NOTICED. OR THE OPPOSITE, BEING SO FIGETY OR RESTLESS THAT YOU HAVE BEEN MOVING AROUND A LOT MORE THAN USUAL: NOT AT ALL
SUM OF ALL RESPONSES TO PHQ QUESTIONS 1-9: 3
SUM OF ALL RESPONSES TO PHQ QUESTIONS 1-9: 3
4. FEELING TIRED OR HAVING LITTLE ENERGY: NOT AT ALL
SUM OF ALL RESPONSES TO PHQ9 QUESTIONS 1 & 2: 1
10. IF YOU CHECKED OFF ANY PROBLEMS, HOW DIFFICULT HAVE THESE PROBLEMS MADE IT FOR YOU TO DO YOUR WORK, TAKE CARE OF THINGS AT HOME, OR GET ALONG WITH OTHER PEOPLE: SOMEWHAT DIFFICULT
7. TROUBLE CONCENTRATING ON THINGS, SUCH AS READING THE NEWSPAPER OR WATCHING TELEVISION: NOT AT ALL
5. POOR APPETITE OR OVEREATING: SEVERAL DAYS
SUM OF ALL RESPONSES TO PHQ QUESTIONS 1-9: 3
2. FEELING DOWN, DEPRESSED OR HOPELESS: NOT AT ALL
6. FEELING BAD ABOUT YOURSELF - OR THAT YOU ARE A FAILURE OR HAVE LET YOURSELF OR YOUR FAMILY DOWN: NOT AT ALL
3. TROUBLE FALLING OR STAYING ASLEEP: SEVERAL DAYS

## 2024-12-12 ASSESSMENT — ENCOUNTER SYMPTOMS
DIARRHEA: 0
SORE THROAT: 0
ABDOMINAL PAIN: 0
EYE PAIN: 0
COUGH: 0
BACK PAIN: 0
VOMITING: 0
RHINORRHEA: 0
NAUSEA: 0
CONSTIPATION: 0
SHORTNESS OF BREATH: 0
SINUS PAIN: 0

## 2024-12-12 NOTE — PROGRESS NOTES
[facility]  [address]  [facility] [address]  Tel# [phone]  Fax# [phone]     Riana Moore DNP, FNP-BC  Family Nurse Practitioner            Date of Visit: 2024     Joanne May (: 1980) is a 44 y.o. female established patient, here for evaluation of the following chief complaint(s):    Chief Complaint   Patient presents with    Migraine     Patient following up on her migraines.         Patient Care Team:  Riana Moore, KASIA - CNP as PCP - General (Nurse Practitioner Family)  Riana Moore APRN - CNP as PCP - Empaneled Provider         History of Present Illness      Presents here for migraine headache follow up. Had urgent care visit yesterday - [facility] Visit.        Migraine  2024 visit as new patient.     Chronic  Started years ago around , about 14 years ago.  Had stopped going to [employer] in .  States she was working from home in  to .  Moved to [place] 2022 to Aug 2, 2022.  Moved to [place] Aug 3, 2024.  For the last 4 years, gets migraine headache once a month, duration 4 hours, resolved with ibuprofen.  Started having headache frequency since 2024 -- 2-3x a month, lasting 3-4 days, takes ibuprofen.  Denies going to an urgent care.   Noticed blurred vision since 2024. Denies going to an eye doctor or urgent care.   Would like to have [facility] for work re: migraines, blurred vision in the future. States she lives in [place], [place]  And still works at [employer], [place], driving 2.5-3 hrs to/from work.     2024  States since starting Atenolol, headache has been less, denies blurred vision. Has upcoming appt with eye doctor next week.        10/9/2024  Pt was seen at an urgent care on 10/2/2024 - [facility].  Pt states that Tylenol with Codeine helps with her headache, but makes her drowsy.     Pt states \"the headache does not go away\". Describes as \"manageable dull

## 2024-12-13 DIAGNOSIS — G43.909 MIGRAINE WITHOUT STATUS MIGRAINOSUS, NOT INTRACTABLE, UNSPECIFIED MIGRAINE TYPE: ICD-10-CM

## 2024-12-19 ENCOUNTER — OFFICE VISIT (OUTPATIENT)
Dept: INTERNAL MEDICINE CLINIC | Facility: CLINIC | Age: 44
End: 2024-12-19
Payer: COMMERCIAL

## 2024-12-19 VITALS
WEIGHT: 277.3 LBS | DIASTOLIC BLOOD PRESSURE: 79 MMHG | OXYGEN SATURATION: 100 % | HEART RATE: 100 BPM | HEIGHT: 64 IN | SYSTOLIC BLOOD PRESSURE: 138 MMHG | BODY MASS INDEX: 47.34 KG/M2 | TEMPERATURE: 98.3 F

## 2024-12-19 DIAGNOSIS — I10 PRIMARY HYPERTENSION: ICD-10-CM

## 2024-12-19 DIAGNOSIS — F41.1 GAD (GENERALIZED ANXIETY DISORDER): ICD-10-CM

## 2024-12-19 DIAGNOSIS — G43.909 MIGRAINE WITHOUT STATUS MIGRAINOSUS, NOT INTRACTABLE, UNSPECIFIED MIGRAINE TYPE: Primary | ICD-10-CM

## 2024-12-19 DIAGNOSIS — H53.8 BLURRED VISION: ICD-10-CM

## 2024-12-19 PROCEDURE — 3075F SYST BP GE 130 - 139MM HG: CPT | Performed by: NURSE PRACTITIONER

## 2024-12-19 PROCEDURE — 3078F DIAST BP <80 MM HG: CPT | Performed by: NURSE PRACTITIONER

## 2024-12-19 PROCEDURE — 99214 OFFICE O/P EST MOD 30 MIN: CPT | Performed by: NURSE PRACTITIONER

## 2024-12-19 ASSESSMENT — ENCOUNTER SYMPTOMS
NAUSEA: 0
SHORTNESS OF BREATH: 0
EYE PAIN: 0
ABDOMINAL PAIN: 0
VOMITING: 0
DIARRHEA: 0
SINUS PAIN: 0
CONSTIPATION: 0
RHINORRHEA: 0
BACK PAIN: 0
SORE THROAT: 0
COUGH: 0

## 2024-12-19 NOTE — PROGRESS NOTES
[facility]  [address]  [facility] [address]  Tel# [phone]  Fax# [phone]     Riana Moore DNP, FNP-BC  Family Nurse Practitioner            Date of Visit: 2024     Joanne May (: 1980) is a 44 y.o. female established patient, here for evaluation of the following chief complaint(s):    Chief Complaint   Patient presents with    Migraine     Patient is following up on her migraines.         Patient Care Team:  Riana Moore, KASIA - CNP as PCP - General (Nurse Practitioner Family)  Riana Moore APRN - CNP as PCP - Empaneled Provider         History of Present Illness          Presents here with spouse for migraine follow up.        Migraine  2024 visit as new patient.     Chronic  Started years ago around , about 14 years ago.  Had stopped going to [employer] in .  States she was working from home in  to .  Moved to [place] 2022 to Aug 2, 2022.  Moved to [place] Aug 3, 2024.  For the last 4 years, gets migraine headache once a month, duration 4 hours, resolved with ibuprofen.  Started having headache frequency since 2024 -- 2-3x a month, lasting 3-4 days, takes ibuprofen.  Denies going to an urgent care.   Noticed blurred vision since 2024. Denies going to an eye doctor or urgent care.   Would like to have [facility] for work re: migraines, blurred vision in the future. States she lives in [place], [place]  And still works at [employer], [place], driving 2.5-3 hrs to/from work.     2024  States since starting Atenolol, headache has been less, denies blurred vision. Has upcoming appt with eye doctor next week.        10/9/2024  Pt was seen at an urgent care on 10/2/2024 - [facility].  Pt states that Tylenol with Codeine helps with her headache, but makes her drowsy.     Pt states \"the headache does not go away\". Describes as \"manageable dull pain\".  Pt states on 2024, she was in her drive way in

## 2025-01-03 ASSESSMENT — PATIENT HEALTH QUESTIONNAIRE - PHQ9
1. LITTLE INTEREST OR PLEASURE IN DOING THINGS: NOT AT ALL
4. FEELING TIRED OR HAVING LITTLE ENERGY: SEVERAL DAYS
2. FEELING DOWN, DEPRESSED OR HOPELESS: NOT AT ALL
SUM OF ALL RESPONSES TO PHQ QUESTIONS 1-9: 3
SUM OF ALL RESPONSES TO PHQ QUESTIONS 1-9: 3
5. POOR APPETITE OR OVEREATING: SEVERAL DAYS
7. TROUBLE CONCENTRATING ON THINGS, SUCH AS READING THE NEWSPAPER OR WATCHING TELEVISION: NOT AT ALL
8. MOVING OR SPEAKING SO SLOWLY THAT OTHER PEOPLE COULD HAVE NOTICED. OR THE OPPOSITE - BEING SO FIDGETY OR RESTLESS THAT YOU HAVE BEEN MOVING AROUND A LOT MORE THAN USUAL: NOT AT ALL
2. FEELING DOWN, DEPRESSED OR HOPELESS: NOT AT ALL
9. THOUGHTS THAT YOU WOULD BE BETTER OFF DEAD, OR OF HURTING YOURSELF: NOT AT ALL
6. FEELING BAD ABOUT YOURSELF - OR THAT YOU ARE A FAILURE OR HAVE LET YOURSELF OR YOUR FAMILY DOWN: NOT AT ALL
7. TROUBLE CONCENTRATING ON THINGS, SUCH AS READING THE NEWSPAPER OR WATCHING TELEVISION: NOT AT ALL
9. THOUGHTS THAT YOU WOULD BE BETTER OFF DEAD, OR OF HURTING YOURSELF: NOT AT ALL
10. IF YOU CHECKED OFF ANY PROBLEMS, HOW DIFFICULT HAVE THESE PROBLEMS MADE IT FOR YOU TO DO YOUR WORK, TAKE CARE OF THINGS AT HOME, OR GET ALONG WITH OTHER PEOPLE: NOT DIFFICULT AT ALL
10. IF YOU CHECKED OFF ANY PROBLEMS, HOW DIFFICULT HAVE THESE PROBLEMS MADE IT FOR YOU TO DO YOUR WORK, TAKE CARE OF THINGS AT HOME, OR GET ALONG WITH OTHER PEOPLE: NOT DIFFICULT AT ALL
8. MOVING OR SPEAKING SO SLOWLY THAT OTHER PEOPLE COULD HAVE NOTICED. OR THE OPPOSITE, BEING SO FIGETY OR RESTLESS THAT YOU HAVE BEEN MOVING AROUND A LOT MORE THAN USUAL: NOT AT ALL
5. POOR APPETITE OR OVEREATING: SEVERAL DAYS
SUM OF ALL RESPONSES TO PHQ9 QUESTIONS 1 & 2: 0
6. FEELING BAD ABOUT YOURSELF - OR THAT YOU ARE A FAILURE OR HAVE LET YOURSELF OR YOUR FAMILY DOWN: NOT AT ALL
3. TROUBLE FALLING OR STAYING ASLEEP: SEVERAL DAYS
SUM OF ALL RESPONSES TO PHQ QUESTIONS 1-9: 3
1. LITTLE INTEREST OR PLEASURE IN DOING THINGS: NOT AT ALL
3. TROUBLE FALLING OR STAYING ASLEEP: SEVERAL DAYS
SUM OF ALL RESPONSES TO PHQ QUESTIONS 1-9: 3
SUM OF ALL RESPONSES TO PHQ QUESTIONS 1-9: 3
4. FEELING TIRED OR HAVING LITTLE ENERGY: SEVERAL DAYS

## 2025-01-06 ENCOUNTER — OFFICE VISIT (OUTPATIENT)
Dept: INTERNAL MEDICINE CLINIC | Facility: CLINIC | Age: 45
End: 2025-01-06
Payer: COMMERCIAL

## 2025-01-06 VITALS
OXYGEN SATURATION: 100 % | HEIGHT: 64 IN | TEMPERATURE: 97.6 F | HEART RATE: 82 BPM | BODY MASS INDEX: 47.85 KG/M2 | WEIGHT: 280.3 LBS | DIASTOLIC BLOOD PRESSURE: 72 MMHG | RESPIRATION RATE: 17 BRPM | SYSTOLIC BLOOD PRESSURE: 130 MMHG

## 2025-01-06 DIAGNOSIS — I10 PRIMARY HYPERTENSION: ICD-10-CM

## 2025-01-06 DIAGNOSIS — G43.909 MIGRAINE WITHOUT STATUS MIGRAINOSUS, NOT INTRACTABLE, UNSPECIFIED MIGRAINE TYPE: Primary | ICD-10-CM

## 2025-01-06 PROCEDURE — 3075F SYST BP GE 130 - 139MM HG: CPT | Performed by: NURSE PRACTITIONER

## 2025-01-06 PROCEDURE — 3078F DIAST BP <80 MM HG: CPT | Performed by: NURSE PRACTITIONER

## 2025-01-06 PROCEDURE — 99213 OFFICE O/P EST LOW 20 MIN: CPT | Performed by: NURSE PRACTITIONER

## 2025-01-06 ASSESSMENT — ENCOUNTER SYMPTOMS
COUGH: 0
CONSTIPATION: 0
BACK PAIN: 0
SINUS PAIN: 0
RHINORRHEA: 0
SORE THROAT: 0
NAUSEA: 1
EYE PAIN: 0
VOMITING: 0
SHORTNESS OF BREATH: 0
DIARRHEA: 0
ABDOMINAL PAIN: 0

## 2025-01-06 NOTE — PROGRESS NOTES
[facility]  [address]  [facility] [address]  Tel# [phone]  Fax# [phone]     Riana Moore DNP, FNP-BC  Family Nurse Practitioner            Date of Visit: 2025     Joanne May (: 1980) is a 45 y.o. female established patient, here for evaluation of the following chief complaint(s):    Chief Complaint   Patient presents with    Migraine         Patient Care Team:  Riana Moore APRN - CNP as PCP - General (Nurse Practitioner Family)  Riana Moore APRN - CNP as PCP - Empaneled Provider         History of Present Illness        Presents here with spouse for follow up on migraine headache.      Migraine  2024 visit as new patient.     Chronic  Started years ago around , about 14 years ago.  Had stopped going to [employer] in .  States she was working from home in  to .  Moved to [place] 2022 to Aug 2, 2022.  Moved to [place] Aug 3, 2024.  For the last 4 years, gets migraine headache once a month, duration 4 hours, resolved with ibuprofen.  Started having headache frequency since 2024 -- 2-3x a month, lasting 3-4 days, takes ibuprofen.  Denies going to an urgent care.   Noticed blurred vision since 2024. Denies going to an eye doctor or urgent care.   Would like to have [facility] for work re: migraines, blurred vision in the future. States she lives in [place], [place]  And still works at [place], [place], driving 2.5-3 hrs to/from work.     2024  States since starting Atenolol, headache has been less, denies blurred vision. Has upcoming appt with eye doctor next week.        10/9/2024  Pt was seen at an urgent care on 10/2/2024 - [facility].  Pt states that Tylenol with Codeine helps with her headache, but makes her drowsy.     Pt states \"the headache does not go away\". Describes as \"manageable dull pain\".  Pt states on 2024, she was in her drive way in her car. Pt states she still a house

## 2025-01-13 ENCOUNTER — TELEPHONE (OUTPATIENT)
Dept: RADIATION ONCOLOGY | Age: 45
End: 2025-01-13

## 2025-01-13 NOTE — TELEPHONE ENCOUNTER
Physician provider: Dr. Jones  Reason for today's call (Please detail here patients chief complaint): Pt need to rs appt.   Last office visit:na  Patient Callback Number: [phone]  Was callback number verified?: Yes  Preferred pharmacy (If refill request): na  Calls to office within the last 48 hours?:No    Patient notified that their information will be routed to the [facility] clinical triage team for review. Patient is advised that they will receive a phone call from the triage department. If symptom related and symptoms worsen before receiving a call back, the patient has been advised to proceed to the nearest ED.

## 2025-01-14 ENCOUNTER — TELEPHONE (OUTPATIENT)
Dept: ONCOLOGY | Age: 45
End: 2025-01-14

## 2025-01-14 ENCOUNTER — PATIENT MESSAGE (OUTPATIENT)
Dept: INTERNAL MEDICINE CLINIC | Facility: CLINIC | Age: 45
End: 2025-01-14

## 2025-01-14 DIAGNOSIS — G43.909 MIGRAINE WITHOUT STATUS MIGRAINOSUS, NOT INTRACTABLE, UNSPECIFIED MIGRAINE TYPE: ICD-10-CM

## 2025-01-15 ENCOUNTER — OFFICE VISIT (OUTPATIENT)
Dept: ENDOCRINOLOGY | Age: 45
End: 2025-01-15
Payer: COMMERCIAL

## 2025-01-15 VITALS
RESPIRATION RATE: 18 BRPM | BODY MASS INDEX: 47.12 KG/M2 | WEIGHT: 276 LBS | DIASTOLIC BLOOD PRESSURE: 76 MMHG | HEIGHT: 64 IN | OXYGEN SATURATION: 98 % | HEART RATE: 72 BPM | SYSTOLIC BLOOD PRESSURE: 134 MMHG

## 2025-01-15 DIAGNOSIS — E23.6 EMPTY SELLA (HCC): ICD-10-CM

## 2025-01-15 DIAGNOSIS — R51.9 NONINTRACTABLE HEADACHE, UNSPECIFIED CHRONICITY PATTERN, UNSPECIFIED HEADACHE TYPE: ICD-10-CM

## 2025-01-15 DIAGNOSIS — E23.6 EMPTY SELLA (HCC): Primary | ICD-10-CM

## 2025-01-15 LAB
CORTIS AM PEAK SERPL-MCNC: 5.4 UG/DL (ref 4.8–19.5)
ESTRADIOL SERPL-MCNC: 73.4 PG/ML
FSH SERPL-ACNC: 2.1 MIU/ML
LH SERPL-ACNC: 2.8 MIU/ML
PROLACTIN SERPL-MCNC: 8.8 NG/ML (ref 4.8–23.3)
T4 FREE SERPL-MCNC: 1.1 NG/DL (ref 0.9–1.7)
TSH, 3RD GENERATION: 2.03 UIU/ML (ref 0.27–4.2)

## 2025-01-15 PROCEDURE — 3078F DIAST BP <80 MM HG: CPT | Performed by: INTERNAL MEDICINE

## 2025-01-15 PROCEDURE — 99204 OFFICE O/P NEW MOD 45 MIN: CPT | Performed by: INTERNAL MEDICINE

## 2025-01-15 PROCEDURE — 3075F SYST BP GE 130 - 139MM HG: CPT | Performed by: INTERNAL MEDICINE

## 2025-01-15 ASSESSMENT — ENCOUNTER SYMPTOMS: SHORTNESS OF BREATH: 0

## 2025-01-15 NOTE — PROGRESS NOTES
Mark Anthony Urrutia MD, FACE  [facility]  [address], [address]  [place] [address]  Phone [phone]  Facsimile [phone]          Joanne May is a 45 y.o. female seen 1/15/2025 at the request of GLORIA Castelan for the evaluation of empty sella        ASSESSMENT AND PLAN:    1. Empty sella (HCC)  She was incidentally noted to have a partial empty sella on MRI brain 2024.  She likely has primary empty sella due to a defect in the diaphragmatic sella with accumulation of cerebrospinal fluid within the sella turcica.  This leads to a flattening of the pituitary gland.  We discussed that empty sella is generally not associated with anterior pituitary hormone dysfunction.  I will evaluate her anterior pituitary hormone function as below.  Assuming her hormonal evaluation is normal, no further workup is needed.    2. Nonintractable headache, unspecified chronicity pattern, unspecified headache type  She has been suffering from frequent headaches recently.  We did discuss that empty sella can be associated with intracranial hypertension, and I encouraged her to discuss this possibility with neurology at her upcoming appointment.      Follow-up and Dispositions    Return To be determined.         HISTORY OF PRESENT ILLNESS:    PITUITARY DISEASE    Presentation/Diagnosis/Treatment: Incidentally noted on MRI of the brain during the evaluation of headaches.    Symptoms: She reports headaches 2-3 times per week, felt to be due to migraines.  She plans to see neurology 2025.  Migraine medications are effective.  She states that her vision has been blurry and she is not driving.  Denies tunnel vision, diplopia.  Denies galactorrhea, irregular menses (menses are very heavy).  No history of infertility (A1).  Denies weight changes.  She reports fatigue.  She reports nausea and vomiting when she has headaches.  Denies bowel dysfunction, temperature

## 2025-01-16 LAB — ACTH PLAS-MCNC: 27.6 PG/ML (ref 7.2–63.3)

## 2025-01-17 LAB — IGF-I SERPL-MCNC: 87 NG/ML (ref 74–239)

## 2025-01-23 ENCOUNTER — TELEPHONE (OUTPATIENT)
Dept: ONCOLOGY | Age: 45
End: 2025-01-23

## 2025-02-05 ENCOUNTER — OFFICE VISIT (OUTPATIENT)
Dept: NEUROLOGY | Age: 45
End: 2025-02-05
Payer: COMMERCIAL

## 2025-02-05 ENCOUNTER — CLINICAL DOCUMENTATION (OUTPATIENT)
Dept: NEUROLOGY | Age: 45
End: 2025-02-05

## 2025-02-05 VITALS
WEIGHT: 278 LBS | SYSTOLIC BLOOD PRESSURE: 136 MMHG | BODY MASS INDEX: 47.72 KG/M2 | DIASTOLIC BLOOD PRESSURE: 88 MMHG | HEART RATE: 103 BPM | OXYGEN SATURATION: 98 %

## 2025-02-05 DIAGNOSIS — M62.838 CERVICAL PARASPINAL MUSCLE SPASM: ICD-10-CM

## 2025-02-05 DIAGNOSIS — G43.E11 INTRACTABLE CHRONIC MIGRAINE WITH AURA WITH STATUS MIGRAINOSUS: Primary | ICD-10-CM

## 2025-02-05 DIAGNOSIS — R06.83 SNORING: ICD-10-CM

## 2025-02-05 PROCEDURE — 3075F SYST BP GE 130 - 139MM HG: CPT | Performed by: NURSE PRACTITIONER

## 2025-02-05 PROCEDURE — 3079F DIAST BP 80-89 MM HG: CPT | Performed by: NURSE PRACTITIONER

## 2025-02-05 PROCEDURE — 99204 OFFICE O/P NEW MOD 45 MIN: CPT | Performed by: NURSE PRACTITIONER

## 2025-02-05 RX ORDER — ALBUTEROL SULFATE 90 UG/1
4 INHALANT RESPIRATORY (INHALATION) PRN
OUTPATIENT
Start: 2025-02-05

## 2025-02-05 RX ORDER — METOCLOPRAMIDE HYDROCHLORIDE 5 MG/ML
10 INJECTION INTRAMUSCULAR; INTRAVENOUS ONCE
OUTPATIENT
Start: 2025-02-05

## 2025-02-05 RX ORDER — ACETAMINOPHEN 325 MG/1
650 TABLET ORAL
OUTPATIENT
Start: 2025-02-05

## 2025-02-05 RX ORDER — EPINEPHRINE 1 MG/ML
0.3 INJECTION, SOLUTION, CONCENTRATE INTRAVENOUS PRN
OUTPATIENT
Start: 2025-02-05

## 2025-02-05 RX ORDER — 0.9 % SODIUM CHLORIDE 0.9 %
1000 INTRAVENOUS SOLUTION INTRAVENOUS ONCE
OUTPATIENT
Start: 2025-02-05 | End: 2025-02-05

## 2025-02-05 RX ORDER — SODIUM CHLORIDE 9 MG/ML
5-250 INJECTION, SOLUTION INTRAVENOUS PRN
OUTPATIENT
Start: 2025-02-05

## 2025-02-05 RX ORDER — SODIUM CHLORIDE 0.9 % (FLUSH) 0.9 %
5-40 SYRINGE (ML) INJECTION PRN
OUTPATIENT
Start: 2025-02-05

## 2025-02-05 RX ORDER — UBROGEPANT 100 MG/1
TABLET ORAL
Qty: 16 TABLET | Refills: 3 | Status: SHIPPED | OUTPATIENT
Start: 2025-02-05

## 2025-02-05 RX ORDER — MAGNESIUM SULFATE IN WATER 40 MG/ML
2000 INJECTION, SOLUTION INTRAVENOUS ONCE
OUTPATIENT
Start: 2025-02-05

## 2025-02-05 RX ORDER — HYDROCORTISONE SODIUM SUCCINATE 100 MG/2ML
100 INJECTION INTRAMUSCULAR; INTRAVENOUS
OUTPATIENT
Start: 2025-02-05

## 2025-02-05 RX ORDER — HEPARIN 100 UNIT/ML
500 SYRINGE INTRAVENOUS PRN
OUTPATIENT
Start: 2025-02-05

## 2025-02-05 RX ORDER — ATENOLOL 25 MG/1
25 TABLET ORAL DAILY
COMMUNITY

## 2025-02-05 RX ORDER — FREMANEZUMAB-VFRM 225 MG/1.5ML
225 INJECTION SUBCUTANEOUS
Qty: 4.5 ML | Refills: 0 | Status: SHIPPED | OUTPATIENT
Start: 2025-02-05 | End: 2025-03-07

## 2025-02-05 RX ORDER — DIPHENHYDRAMINE HYDROCHLORIDE 50 MG/ML
50 INJECTION INTRAMUSCULAR; INTRAVENOUS
OUTPATIENT
Start: 2025-02-05

## 2025-02-05 RX ORDER — SODIUM CHLORIDE 9 MG/ML
INJECTION, SOLUTION INTRAVENOUS CONTINUOUS
OUTPATIENT
Start: 2025-02-05

## 2025-02-05 ASSESSMENT — PATIENT HEALTH QUESTIONNAIRE - PHQ9
DEPRESSION UNABLE TO ASSESS: FUNCTIONAL CAPACITY MOTIVATION LIMITS ACCURACY
SUM OF ALL RESPONSES TO PHQ9 QUESTIONS 1 & 2: 0
SUM OF ALL RESPONSES TO PHQ QUESTIONS 1-9: 0
SUM OF ALL RESPONSES TO PHQ QUESTIONS 1-9: 0
2. FEELING DOWN, DEPRESSED OR HOPELESS: NOT AT ALL
SUM OF ALL RESPONSES TO PHQ QUESTIONS 1-9: 0
1. LITTLE INTEREST OR PLEASURE IN DOING THINGS: NOT AT ALL
SUM OF ALL RESPONSES TO PHQ QUESTIONS 1-9: 0

## 2025-02-05 NOTE — PATIENT INSTRUCTIONS
Nurtec: https://www.[email or web address]/savings?utm_source=google&utm_medium=cpc&utm_term=nurtec%20savings%20card&eau=54746165620290674&utm_source=GOOGLE&utm_medium=paidsearch&utm_campaign=[id number]&utm_content=53303552936475579&utm_term=nurtec%20savings%20card&gad_source=1&[email or web address]&gclsrc=aw.ds

## 2025-02-05 NOTE — ASSESSMENT & PLAN NOTE
Patient with intractable migraine, will schedule 2 days of IV migraine infusion.         Start Ajovy, first dose given in office. Teaching provided. Will trial Ubrevley as a rescue though Nurtec would likely be better due to patients n/v. Take 1 tablet at onset of migraine, may be repeated in 2 hours for a max of 2 doses in 24 hours.

## 2025-02-05 NOTE — ASSESSMENT & PLAN NOTE
Encouraged her to keep her at home sleep test appt. Discussed that untreated TAY can manifest in worsening headaches.

## 2025-02-05 NOTE — PROGRESS NOTES
Alcohol use: Never    Drug use: Never    Sexual activity: Yes     Partners: Male     Social Determinants of Health     Financial Resource Strain: Low Risk  (9/5/2024)    Overall Financial Resource Strain (CARDIA)     Difficulty of Paying Living Expenses: Not very hard   Food Insecurity: No Food Insecurity (9/5/2024)    Hunger Vital Sign     Worried About Running Out of Food in the Last Year: Never true     Ran Out of Food in the Last Year: Never true   Transportation Needs: Unknown (9/5/2024)    PRAPARE - Transportation     Lack of Transportation (Non-Medical): No   Physical Activity: Insufficiently Active (9/30/2024)    Exercise Vital Sign     Days of Exercise per Week: 2 days     Minutes of Exercise per Session: 30 min   Social Connections: Unknown (10/2/2024)    Received from [facility]    Social Connections     Frequency of Communication with Friends and Family: Not asked     Frequency of Social Gatherings with Friends and Family: Not asked   Intimate Partner Violence: Unknown (10/2/2024)    Received from [facility]    Intimate Partner Violence     Fear of Current or Ex-Partner: Not asked     Emotionally Abused: Not asked     Physically Abused: Not asked     Sexually Abused: Not asked   Housing Stability: Unknown (9/5/2024)    Housing Stability Vital Sign     Homeless in the Last Year: No       Family History:  Family History   Problem Relation Age of Onset    Mult Sclerosis Mother     High Blood Pressure Mother     Other Mother         Multiple Sclerosis    No Known Problems Father     Cancer Maternal Grandmother     Cancer Maternal Uncle        Vital Signs:  Vitals:    02/05/25 1347   BP: 136/88   Site: Left Upper Arm   Position: Sitting   Pulse: (!) 103   SpO2: 98%   Weight: 126.1 kg (278 lb)      Body mass index is 47.72 kg/m².       Physical Exam:  General:  No acute distress, morbidly obese   Head: atraumatic. Normocephalic, bilateral trapezius spams (left > right)  Respiratory: non-labored

## 2025-02-10 ENCOUNTER — NURSE ONLY (OUTPATIENT)
Age: 45
End: 2025-02-10
Payer: COMMERCIAL

## 2025-02-10 VITALS
RESPIRATION RATE: 16 BRPM | TEMPERATURE: 97.5 F | DIASTOLIC BLOOD PRESSURE: 79 MMHG | OXYGEN SATURATION: 98 % | HEART RATE: 84 BPM | SYSTOLIC BLOOD PRESSURE: 132 MMHG

## 2025-02-10 DIAGNOSIS — G43.E11 INTRACTABLE CHRONIC MIGRAINE WITH AURA WITH STATUS MIGRAINOSUS: Primary | ICD-10-CM

## 2025-02-10 PROCEDURE — 96375 TX/PRO/DX INJ NEW DRUG ADDON: CPT | Performed by: PSYCHIATRY & NEUROLOGY

## 2025-02-10 PROCEDURE — 96366 THER/PROPH/DIAG IV INF ADDON: CPT | Performed by: PSYCHIATRY & NEUROLOGY

## 2025-02-10 PROCEDURE — 96365 THER/PROPH/DIAG IV INF INIT: CPT | Performed by: PSYCHIATRY & NEUROLOGY

## 2025-02-10 PROCEDURE — 96367 TX/PROPH/DG ADDL SEQ IV INF: CPT | Performed by: PSYCHIATRY & NEUROLOGY

## 2025-02-10 RX ORDER — SODIUM CHLORIDE 9 MG/ML
5-250 INJECTION, SOLUTION INTRAVENOUS PRN
OUTPATIENT
Start: 2025-02-11

## 2025-02-10 RX ORDER — SODIUM CHLORIDE 9 MG/ML
INJECTION, SOLUTION INTRAVENOUS CONTINUOUS
Status: DISCONTINUED | OUTPATIENT
Start: 2025-02-10 | End: 2025-02-10 | Stop reason: HOSPADM

## 2025-02-10 RX ORDER — SODIUM CHLORIDE 0.9 % (FLUSH) 0.9 %
5-40 SYRINGE (ML) INJECTION PRN
Status: DISCONTINUED | OUTPATIENT
Start: 2025-02-10 | End: 2025-02-10 | Stop reason: HOSPADM

## 2025-02-10 RX ORDER — SODIUM CHLORIDE 9 MG/ML
INJECTION, SOLUTION INTRAVENOUS CONTINUOUS
OUTPATIENT
Start: 2025-02-11

## 2025-02-10 RX ORDER — HYDROCORTISONE SODIUM SUCCINATE 100 MG/2ML
100 INJECTION INTRAMUSCULAR; INTRAVENOUS
Status: DISCONTINUED | OUTPATIENT
Start: 2025-02-10 | End: 2025-02-10 | Stop reason: HOSPADM

## 2025-02-10 RX ORDER — METOCLOPRAMIDE HYDROCHLORIDE 5 MG/ML
10 INJECTION INTRAMUSCULAR; INTRAVENOUS ONCE
Status: COMPLETED | OUTPATIENT
Start: 2025-02-10 | End: 2025-02-10

## 2025-02-10 RX ORDER — SODIUM CHLORIDE 0.9 % (FLUSH) 0.9 %
5-40 SYRINGE (ML) INJECTION PRN
OUTPATIENT
Start: 2025-02-11

## 2025-02-10 RX ORDER — 0.9 % SODIUM CHLORIDE 0.9 %
1000 INTRAVENOUS SOLUTION INTRAVENOUS ONCE
Status: COMPLETED | OUTPATIENT
Start: 2025-02-10 | End: 2025-02-10

## 2025-02-10 RX ORDER — MAGNESIUM SULFATE IN WATER 40 MG/ML
2000 INJECTION, SOLUTION INTRAVENOUS ONCE
OUTPATIENT
Start: 2025-02-11

## 2025-02-10 RX ORDER — SODIUM CHLORIDE 9 MG/ML
5-250 INJECTION, SOLUTION INTRAVENOUS PRN
Status: DISCONTINUED | OUTPATIENT
Start: 2025-02-10 | End: 2025-02-10 | Stop reason: HOSPADM

## 2025-02-10 RX ORDER — MAGNESIUM SULFATE IN WATER 40 MG/ML
2000 INJECTION, SOLUTION INTRAVENOUS ONCE
Status: COMPLETED | OUTPATIENT
Start: 2025-02-10 | End: 2025-02-10

## 2025-02-10 RX ORDER — DIPHENHYDRAMINE HYDROCHLORIDE 50 MG/ML
50 INJECTION INTRAMUSCULAR; INTRAVENOUS
Status: DISCONTINUED | OUTPATIENT
Start: 2025-02-10 | End: 2025-02-10 | Stop reason: HOSPADM

## 2025-02-10 RX ORDER — ACETAMINOPHEN 325 MG/1
650 TABLET ORAL
Status: DISCONTINUED | OUTPATIENT
Start: 2025-02-10 | End: 2025-02-10 | Stop reason: HOSPADM

## 2025-02-10 RX ORDER — ACETAMINOPHEN 325 MG/1
650 TABLET ORAL
OUTPATIENT
Start: 2025-02-11

## 2025-02-10 RX ORDER — HYDROCORTISONE SODIUM SUCCINATE 100 MG/2ML
100 INJECTION INTRAMUSCULAR; INTRAVENOUS
OUTPATIENT
Start: 2025-02-11

## 2025-02-10 RX ORDER — ALBUTEROL SULFATE 90 UG/1
4 INHALANT RESPIRATORY (INHALATION) PRN
OUTPATIENT
Start: 2025-02-11

## 2025-02-10 RX ORDER — 0.9 % SODIUM CHLORIDE 0.9 %
1000 INTRAVENOUS SOLUTION INTRAVENOUS ONCE
OUTPATIENT
Start: 2025-02-11 | End: 2025-02-11

## 2025-02-10 RX ORDER — ALBUTEROL SULFATE 90 UG/1
4 INHALANT RESPIRATORY (INHALATION) PRN
Status: DISCONTINUED | OUTPATIENT
Start: 2025-02-10 | End: 2025-02-10 | Stop reason: HOSPADM

## 2025-02-10 RX ORDER — EPINEPHRINE 1 MG/ML
0.3 INJECTION, SOLUTION, CONCENTRATE INTRAVENOUS PRN
Status: DISCONTINUED | OUTPATIENT
Start: 2025-02-10 | End: 2025-02-10 | Stop reason: HOSPADM

## 2025-02-10 RX ORDER — DIPHENHYDRAMINE HYDROCHLORIDE 50 MG/ML
50 INJECTION INTRAMUSCULAR; INTRAVENOUS
OUTPATIENT
Start: 2025-02-11

## 2025-02-10 RX ORDER — HEPARIN 100 UNIT/ML
500 SYRINGE INTRAVENOUS PRN
OUTPATIENT
Start: 2025-02-11

## 2025-02-10 RX ORDER — METOCLOPRAMIDE HYDROCHLORIDE 5 MG/ML
10 INJECTION INTRAMUSCULAR; INTRAVENOUS ONCE
OUTPATIENT
Start: 2025-02-11

## 2025-02-10 RX ORDER — EPINEPHRINE 1 MG/ML
0.3 INJECTION, SOLUTION, CONCENTRATE INTRAVENOUS PRN
OUTPATIENT
Start: 2025-02-11

## 2025-02-10 RX ORDER — HEPARIN 100 UNIT/ML
500 SYRINGE INTRAVENOUS PRN
Status: DISCONTINUED | OUTPATIENT
Start: 2025-02-10 | End: 2025-02-10 | Stop reason: HOSPADM

## 2025-02-10 RX ADMIN — Medication 1000 ML: at 10:40

## 2025-02-10 RX ADMIN — METOCLOPRAMIDE HYDROCHLORIDE 10 MG: 5 INJECTION INTRAMUSCULAR; INTRAVENOUS at 10:43

## 2025-02-10 RX ADMIN — MAGNESIUM SULFATE IN WATER 2000 MG: 40 INJECTION, SOLUTION INTRAVENOUS at 10:45

## 2025-02-10 NOTE — PROGRESS NOTES
[facility]  [address], [address]  [place] [address]  Office : [phone], Fax: [phone]       Patient arrived ambulatory to the infusion suite today for Migraine Therapy medications. Vital Signs WNL. Pt endorses a migraine pain of 2/10 on admission. Patient offered blankets and pillow for comfort. Pt up ad ru to BR; offered drink and snacks.    20g 1\" IV catheter placed in the right AC x1 attempt(s); flushed with 10ml NS. Patient tolerated well.   5L Oxygen therapy provided for 15 minutes per provider order.  Migraine medications administered per orders and per protocol.   Patient tolerated the infusion well, no complications noted.     Post infusion vital signs WNL. PIV flushed with 10ml NS and removed without difficulty, catheter intact; dressing applied. Patient instructed to leave the dressing on for at least 30 minutes before removal.       Patient ambulatory with steady gait out of infusion suite, discharged feeling well without complications. Pain level is 0/10 on the pain scale at discharge. Patient instructed to call the ordering provider with any post-infusion issues.    Next appointment scheduled with appointment desk prior to patient's departure.

## 2025-02-26 ENCOUNTER — CLINICAL DOCUMENTATION (OUTPATIENT)
Dept: NEUROLOGY | Age: 45
End: 2025-02-26

## 2025-03-05 ENCOUNTER — TELEPHONE (OUTPATIENT)
Dept: ONCOLOGY | Age: 45
End: 2025-03-05

## 2025-03-05 NOTE — TELEPHONE ENCOUNTER
Attempted to reach patient to r/s her missed appointments.  Unable to reach, lvm and sent a [employer] message

## 2025-03-05 NOTE — TELEPHONE ENCOUNTER
Physician provider: Dr. Jones  Reason for today's call (Please detail here patients chief complaint): Provider to Provider  Last office visit:NA  Patient Callback Number: [phone]  Was callback number verified?: Yes  Preferred pharmacy (If refill request): NA  Calls to office within the last 48 hours?:No    Patient notified that their information will be routed to the [facility] clinical triage team for review. Patient is advised that they will receive a phone call from the triage department. If symptom related and symptoms worsen before receiving a call back, the patient has been advised to proceed to the nearest ED.      Dr. Velasco want to know if it is ok to start an IV iron on the patient.  [phone]  Nicole [phone]

## 2025-03-10 ENCOUNTER — PATIENT MESSAGE (OUTPATIENT)
Dept: NEUROLOGY | Age: 45
End: 2025-03-10

## 2025-03-11 ENCOUNTER — TELEPHONE (OUTPATIENT)
Dept: ONCOLOGY | Age: 45
End: 2025-03-11

## 2025-03-14 ENCOUNTER — CLINICAL DOCUMENTATION (OUTPATIENT)
Dept: NEUROLOGY | Age: 45
End: 2025-03-14

## 2025-03-20 ENCOUNTER — HOSPITAL ENCOUNTER (OUTPATIENT)
Dept: LAB | Age: 45
Discharge: HOME | End: 2025-03-20
Payer: COMMERCIAL

## 2025-03-20 ENCOUNTER — OFFICE VISIT (OUTPATIENT)
Dept: ONCOLOGY | Age: 45
End: 2025-03-20
Payer: COMMERCIAL

## 2025-03-20 ENCOUNTER — RESULTS FOLLOW-UP (OUTPATIENT)
Dept: LAB | Age: 45
End: 2025-03-20

## 2025-03-20 VITALS
BODY MASS INDEX: 47.44 KG/M2 | OXYGEN SATURATION: 97 % | WEIGHT: 277.9 LBS | HEART RATE: 79 BPM | SYSTOLIC BLOOD PRESSURE: 133 MMHG | DIASTOLIC BLOOD PRESSURE: 87 MMHG | HEIGHT: 64 IN | RESPIRATION RATE: 17 BRPM | TEMPERATURE: 98.2 F

## 2025-03-20 DIAGNOSIS — D64.9 ANEMIA, UNSPECIFIED TYPE: ICD-10-CM

## 2025-03-20 DIAGNOSIS — D50.9 IRON DEFICIENCY ANEMIA, UNSPECIFIED IRON DEFICIENCY ANEMIA TYPE: Primary | ICD-10-CM

## 2025-03-20 LAB
BASOPHILS # BLD: 0.05 K/UL (ref 0–0.2)
BASOPHILS NFR BLD: 0.5 % (ref 0–2)
DIFFERENTIAL METHOD BLD: ABNORMAL
EOSINOPHIL # BLD: 0.17 K/UL (ref 0–0.8)
EOSINOPHIL NFR BLD: 1.8 % (ref 0.5–7.8)
ERYTHROCYTE [DISTWIDTH] IN BLOOD BY AUTOMATED COUNT: 18.2 % (ref 11.9–14.6)
FERRITIN SERPL-MCNC: 16 NG/ML (ref 8–388)
HCT VFR BLD AUTO: 33.4 % (ref 35.8–46.3)
HGB BLD-MCNC: 9.6 G/DL (ref 11.7–15.4)
IMM GRANULOCYTES # BLD AUTO: 0.03 K/UL (ref 0–0.5)
IMM GRANULOCYTES NFR BLD AUTO: 0.3 % (ref 0–5)
IRON SATN MFR SERPL: 6 % (ref 20–50)
IRON SERPL-MCNC: 22 UG/DL (ref 35–100)
LYMPHOCYTES # BLD: 3.42 K/UL (ref 0.5–4.6)
LYMPHOCYTES NFR BLD: 36.1 % (ref 13–44)
MCH RBC QN AUTO: 20.1 PG (ref 26.1–32.9)
MCHC RBC AUTO-ENTMCNC: 28.7 G/DL (ref 31.4–35)
MCV RBC AUTO: 70 FL (ref 82–102)
MONOCYTES # BLD: 0.47 K/UL (ref 0.1–1.3)
MONOCYTES NFR BLD: 5 % (ref 4–12)
NEUTS SEG # BLD: 5.33 K/UL (ref 1.7–8.2)
NEUTS SEG NFR BLD: 56.3 % (ref 43–78)
NRBC # BLD: 0 K/UL (ref 0–0.2)
PLATELET # BLD AUTO: 398 K/UL (ref 150–450)
PMV BLD AUTO: 9.4 FL (ref 9.4–12.3)
RBC # BLD AUTO: 4.77 M/UL (ref 4.05–5.2)
TIBC SERPL-MCNC: 357 UG/DL (ref 240–450)
UIBC SERPL-MCNC: 335 UG/DL (ref 112–347)
WBC # BLD AUTO: 9.5 K/UL (ref 4.3–11.1)

## 2025-03-20 PROCEDURE — 99213 OFFICE O/P EST LOW 20 MIN: CPT | Performed by: STUDENT IN AN ORGANIZED HEALTH CARE EDUCATION/TRAINING PROGRAM

## 2025-03-20 PROCEDURE — 83540 ASSAY OF IRON: CPT

## 2025-03-20 PROCEDURE — 85025 COMPLETE CBC W/AUTO DIFF WBC: CPT

## 2025-03-20 PROCEDURE — 36415 COLL VENOUS BLD VENIPUNCTURE: CPT

## 2025-03-20 PROCEDURE — 3075F SYST BP GE 130 - 139MM HG: CPT | Performed by: STUDENT IN AN ORGANIZED HEALTH CARE EDUCATION/TRAINING PROGRAM

## 2025-03-20 PROCEDURE — 83550 IRON BINDING TEST: CPT

## 2025-03-20 PROCEDURE — 3079F DIAST BP 80-89 MM HG: CPT | Performed by: STUDENT IN AN ORGANIZED HEALTH CARE EDUCATION/TRAINING PROGRAM

## 2025-03-20 PROCEDURE — 82728 ASSAY OF FERRITIN: CPT

## 2025-03-20 RX ORDER — SODIUM CHLORIDE 9 MG/ML
INJECTION, SOLUTION INTRAVENOUS CONTINUOUS
OUTPATIENT
Start: 2025-03-21

## 2025-03-20 RX ORDER — HEPARIN SODIUM (PORCINE) LOCK FLUSH IV SOLN 100 UNIT/ML 100 UNIT/ML
500 SOLUTION INTRAVENOUS PRN
OUTPATIENT
Start: 2025-03-21

## 2025-03-20 RX ORDER — ACETAMINOPHEN 325 MG/1
650 TABLET ORAL
OUTPATIENT
Start: 2025-03-21

## 2025-03-20 RX ORDER — HYDROCORTISONE SODIUM SUCCINATE 100 MG/2ML
100 INJECTION INTRAMUSCULAR; INTRAVENOUS
OUTPATIENT
Start: 2025-03-21

## 2025-03-20 RX ORDER — EPINEPHRINE 1 MG/ML
0.3 INJECTION, SOLUTION, CONCENTRATE INTRAVENOUS PRN
OUTPATIENT
Start: 2025-03-21

## 2025-03-20 RX ORDER — ALBUTEROL SULFATE 90 UG/1
4 INHALANT RESPIRATORY (INHALATION) PRN
OUTPATIENT
Start: 2025-03-21

## 2025-03-20 RX ORDER — DIPHENHYDRAMINE HYDROCHLORIDE 50 MG/ML
50 INJECTION, SOLUTION INTRAMUSCULAR; INTRAVENOUS
OUTPATIENT
Start: 2025-03-21

## 2025-03-20 RX ORDER — SODIUM CHLORIDE 9 MG/ML
5-250 INJECTION, SOLUTION INTRAVENOUS PRN
OUTPATIENT
Start: 2025-03-21

## 2025-03-20 RX ORDER — FAMOTIDINE 10 MG/ML
20 INJECTION, SOLUTION INTRAVENOUS
OUTPATIENT
Start: 2025-03-21

## 2025-03-20 RX ORDER — SODIUM CHLORIDE 0.9 % (FLUSH) 0.9 %
5-40 SYRINGE (ML) INJECTION PRN
OUTPATIENT
Start: 2025-03-21

## 2025-03-20 ASSESSMENT — PATIENT HEALTH QUESTIONNAIRE - PHQ9
SUM OF ALL RESPONSES TO PHQ QUESTIONS 1-9: 0
SUM OF ALL RESPONSES TO PHQ QUESTIONS 1-9: 0
2. FEELING DOWN, DEPRESSED OR HOPELESS: NOT AT ALL
1. LITTLE INTEREST OR PLEASURE IN DOING THINGS: NOT AT ALL
SUM OF ALL RESPONSES TO PHQ QUESTIONS 1-9: 0
SUM OF ALL RESPONSES TO PHQ QUESTIONS 1-9: 0

## 2025-03-20 NOTE — PROGRESS NOTES
found.      Patient Active Problem List   Diagnosis    Intractable chronic migraine with aura with status migrainosus    Primary hypertension    Anemia    Empty sella    Snoring    Cervical paraspinal muscle spasm       ASSESSMENT:     Diagnosis Orders   1. Iron deficiency anemia, unspecified iron deficiency anemia type  [facility], [place]            45-year-old female with above diagnoses for follow-up.    - Lab studies were personally reviewed. Pertinent old records were reviewed.  -Iron deficiency anemia on initial consult day labs  -Continues to have menorrhagia, fatigue and intermittent palpitations  -On oral iron for last 5 months      Labs today pending.  If she has not responded appropriately to oral iron we will plan for IV iron.    Side effects of iv iron were discussed with the patient including but not limited to flushing, hyper/hypotension, injection side discomfort, rash, headaches, increased liver enzymes, GI side effects, anaphylaxis, allergic reaction, angioedema, fever, pruritus, dyspnea.  Patient is agreeable.    All questions were asked and answered to the best of my ability.  Asked patient to call clinic for any questions or concerns prior to next visit.               Rosy Joens MD  [facility]  [address] [place] [address]  Office : [phone]  Fax : [phone]

## 2025-03-20 NOTE — PATIENT INSTRUCTIONS
Patient Information from Today's Visit    Diagnosis: Anemia      Follow Up Instructions: 6 months      Treatment Summary has been discussed and given to patient: N/A      Current Labs: Labs to be drawn after today's office visit            Please refer to After Visit Summary or [employer] for upcoming appointment information. If you have any questions regarding your upcoming schedule please reach out to your care team through [employer] or call [phone].    Please notify your assigned Nurse Navigator of any unplanned hospital admissions or Emergency Room visits within 24 hours of discharge.    -------------------------------------------------------------------------------------------------------------------  Please call our office at [phone] if you have any  of the following symptoms:   Fever of 100.5 or greater  Chills  Shortness of breath  Swelling or pain in one leg    After office hours an answering service is available and will contact a provider for emergencies or if you are experiencing any of the above symptoms.        KAREEM RUELAS MA

## 2025-03-21 ENCOUNTER — TELEPHONE (OUTPATIENT)
Dept: ONCOLOGY | Age: 45
End: 2025-03-21

## 2025-03-25 SDOH — HEALTH STABILITY: PHYSICAL HEALTH: ON AVERAGE, HOW MANY DAYS PER WEEK DO YOU ENGAGE IN MODERATE TO STRENUOUS EXERCISE (LIKE A BRISK WALK)?: 3 DAYS

## 2025-03-25 SDOH — HEALTH STABILITY: PHYSICAL HEALTH: ON AVERAGE, HOW MANY MINUTES DO YOU ENGAGE IN EXERCISE AT THIS LEVEL?: 30 MIN

## 2025-03-28 ENCOUNTER — OFFICE VISIT (OUTPATIENT)
Dept: FAMILY MEDICINE CLINIC | Facility: CLINIC | Age: 45
End: 2025-03-28
Payer: COMMERCIAL

## 2025-03-28 VITALS
WEIGHT: 279 LBS | OXYGEN SATURATION: 99 % | RESPIRATION RATE: 18 BRPM | DIASTOLIC BLOOD PRESSURE: 90 MMHG | HEART RATE: 69 BPM | TEMPERATURE: 97.6 F | BODY MASS INDEX: 47.63 KG/M2 | SYSTOLIC BLOOD PRESSURE: 138 MMHG | HEIGHT: 64 IN

## 2025-03-28 DIAGNOSIS — D50.8 OTHER IRON DEFICIENCY ANEMIA: ICD-10-CM

## 2025-03-28 DIAGNOSIS — Z76.89 ENCOUNTER TO ESTABLISH CARE WITH NEW DOCTOR: ICD-10-CM

## 2025-03-28 DIAGNOSIS — R06.83 SNORING: ICD-10-CM

## 2025-03-28 DIAGNOSIS — G43.E11 INTRACTABLE CHRONIC MIGRAINE WITH AURA WITH STATUS MIGRAINOSUS: ICD-10-CM

## 2025-03-28 DIAGNOSIS — I10 PRIMARY HYPERTENSION: ICD-10-CM

## 2025-03-28 DIAGNOSIS — E66.01 OBESITY, MORBID, BMI 40.0-49.9: ICD-10-CM

## 2025-03-28 DIAGNOSIS — E23.6 EMPTY SELLA: ICD-10-CM

## 2025-03-28 DIAGNOSIS — R06.81 APNEIC EPISODE: ICD-10-CM

## 2025-03-28 DIAGNOSIS — I10 PRIMARY HYPERTENSION: Primary | ICD-10-CM

## 2025-03-28 LAB
ALBUMIN SERPL-MCNC: 3.4 G/DL (ref 3.5–5)
ALBUMIN/GLOB SERPL: 0.8 (ref 1–1.9)
ALP SERPL-CCNC: 105 U/L (ref 35–104)
ALT SERPL-CCNC: 15 U/L (ref 8–45)
ANION GAP SERPL CALC-SCNC: 12 MMOL/L (ref 7–16)
AST SERPL-CCNC: 25 U/L (ref 15–37)
BILIRUB SERPL-MCNC: 0.4 MG/DL (ref 0–1.2)
BUN SERPL-MCNC: 6 MG/DL (ref 6–23)
CALCIUM SERPL-MCNC: 9.2 MG/DL (ref 8.8–10.2)
CHLORIDE SERPL-SCNC: 103 MMOL/L (ref 98–107)
CHOLEST SERPL-MCNC: 165 MG/DL (ref 0–200)
CO2 SERPL-SCNC: 25 MMOL/L (ref 20–29)
CREAT SERPL-MCNC: 0.59 MG/DL (ref 0.6–1.1)
CREAT UR-MCNC: 317 MG/DL (ref 28–217)
GLOBULIN SER CALC-MCNC: 4.3 G/DL (ref 2.3–3.5)
GLUCOSE SERPL-MCNC: 95 MG/DL (ref 70–99)
HDLC SERPL-MCNC: 45 MG/DL (ref 40–60)
HDLC SERPL: 3.7 (ref 0–5)
LDLC SERPL CALC-MCNC: 100 MG/DL (ref 0–100)
MICROALBUMIN UR-MCNC: 6.64 MG/DL (ref 0–20)
MICROALBUMIN/CREAT UR-RTO: 21 MG/G (ref 0–30)
POTASSIUM SERPL-SCNC: 3.5 MMOL/L (ref 3.5–5.1)
PROT SERPL-MCNC: 7.7 G/DL (ref 6.3–8.2)
SODIUM SERPL-SCNC: 140 MMOL/L (ref 136–145)
TRIGL SERPL-MCNC: 102 MG/DL (ref 0–150)
VLDLC SERPL CALC-MCNC: 20 MG/DL (ref 6–23)

## 2025-03-28 PROCEDURE — 3080F DIAST BP >= 90 MM HG: CPT | Performed by: FAMILY MEDICINE

## 2025-03-28 PROCEDURE — 99205 OFFICE O/P NEW HI 60 MIN: CPT | Performed by: FAMILY MEDICINE

## 2025-03-28 PROCEDURE — 3075F SYST BP GE 130 - 139MM HG: CPT | Performed by: FAMILY MEDICINE

## 2025-03-28 RX ORDER — PROPRANOLOL HYDROCHLORIDE 60 MG/1
60 CAPSULE, EXTENDED RELEASE ORAL DAILY
Qty: 30 CAPSULE | Refills: 0 | Status: SHIPPED | OUTPATIENT
Start: 2025-03-28 | End: 2025-04-27

## 2025-03-28 SDOH — ECONOMIC STABILITY: FOOD INSECURITY: WITHIN THE PAST 12 MONTHS, THE FOOD YOU BOUGHT JUST DIDN'T LAST AND YOU DIDN'T HAVE MONEY TO GET MORE.: NEVER TRUE

## 2025-03-28 SDOH — ECONOMIC STABILITY: FOOD INSECURITY: WITHIN THE PAST 12 MONTHS, YOU WORRIED THAT YOUR FOOD WOULD RUN OUT BEFORE YOU GOT MONEY TO BUY MORE.: NEVER TRUE

## 2025-03-28 ASSESSMENT — PATIENT HEALTH QUESTIONNAIRE - PHQ9
8. MOVING OR SPEAKING SO SLOWLY THAT OTHER PEOPLE COULD HAVE NOTICED. OR THE OPPOSITE, BEING SO FIGETY OR RESTLESS THAT YOU HAVE BEEN MOVING AROUND A LOT MORE THAN USUAL: NOT AT ALL
5. POOR APPETITE OR OVEREATING: NOT AT ALL
7. TROUBLE CONCENTRATING ON THINGS, SUCH AS READING THE NEWSPAPER OR WATCHING TELEVISION: NOT AT ALL
SUM OF ALL RESPONSES TO PHQ QUESTIONS 1-9: 0
4. FEELING TIRED OR HAVING LITTLE ENERGY: NOT AT ALL
1. LITTLE INTEREST OR PLEASURE IN DOING THINGS: NOT AT ALL
10. IF YOU CHECKED OFF ANY PROBLEMS, HOW DIFFICULT HAVE THESE PROBLEMS MADE IT FOR YOU TO DO YOUR WORK, TAKE CARE OF THINGS AT HOME, OR GET ALONG WITH OTHER PEOPLE: NOT DIFFICULT AT ALL
9. THOUGHTS THAT YOU WOULD BE BETTER OFF DEAD, OR OF HURTING YOURSELF: NOT AT ALL
6. FEELING BAD ABOUT YOURSELF - OR THAT YOU ARE A FAILURE OR HAVE LET YOURSELF OR YOUR FAMILY DOWN: NOT AT ALL
SUM OF ALL RESPONSES TO PHQ QUESTIONS 1-9: 0
3. TROUBLE FALLING OR STAYING ASLEEP: NOT AT ALL
2. FEELING DOWN, DEPRESSED OR HOPELESS: NOT AT ALL
SUM OF ALL RESPONSES TO PHQ QUESTIONS 1-9: 0
SUM OF ALL RESPONSES TO PHQ QUESTIONS 1-9: 0

## 2025-03-28 NOTE — ASSESSMENT & PLAN NOTE
Chronic, not at goal (unstable), continue current plan pending work up below    Orders:    Sleep Study with PAP Titration; Future

## 2025-03-28 NOTE — ASSESSMENT & PLAN NOTE
Chronic, at goal (stable), changes made today: Will switch to propranolol 60 mg as this should also help with migraine prophylaxis  Blood pressure is elevated at 138/90 today.    - Did discuss at length the deleterious effects of elevated blood pressure on his overall wellbeing including developing risks of cardiac issues, stroke  - Recommended maintaining a ambulatory blood pressure log and bring it to his next appointment.  Also recommended DASH diet   - Recommended a healthy exercise regimen including 30 minutes of walking.

## 2025-03-28 NOTE — ASSESSMENT & PLAN NOTE
Monitored by specialist- no acute findings meriting change in the plan  Has a f/u with OB-Gyn, also has a infusion scheduled or next month

## 2025-03-28 NOTE — PROGRESS NOTES
Joanne May (:  1980) is a 45 y.o. female,New patient, here for evaluation of the following chief complaint(s):  Establish Care and Other (Talk about weight loss)    Assessment & Plan  Primary hypertension   Chronic, at goal (stable), changes made today: Will switch to propranolol 60 mg as this should also help with migraine prophylaxis  Blood pressure is elevated at 138/90 today.    - Did discuss at length the deleterious effects of elevated blood pressure on his overall wellbeing including developing risks of cardiac issues, stroke  - Recommended maintaining a ambulatory blood pressure log and bring it to his next appointment.  Also recommended DASH diet   - Recommended a healthy exercise regimen including 30 minutes of walking.     Intractable chronic migraine with aura with status migrainosus   Monitored by specialist- no acute findings meriting change in the plan    Other iron deficiency anemia   Monitored by specialist- no acute findings meriting change in the plan  Has a f/u with OB-Gyn, also has a infusion scheduled or next month  Snoring   Chronic, not at goal (unstable), continue current plan pending work up below    Orders:    Sleep Study with PAP Titration; Future    Encounter to establish care with new doctor  - Discussed all age-appropriate vaccinations and screening tests recommended    - Reviewed multiple aspects of preventive care and healthy living including discussions of healthy eating, adequate sleep, exercise, avoiding intake of any noxious substances including smoking or excessive alcohol, plugging into community and family and also tapping into his spiritual foundation all of which contribute to overall wellbeing    Apneic episode   Chronic, not at goal (unstable), continue current plan pending work up below    Orders:    Sleep Study with PAP Titration; Future    Empty sella   Chronic, at goal (stable), was evaluated by endocrinology, laboratory workup was

## 2025-03-29 NOTE — ASSESSMENT & PLAN NOTE
Chronic, at goal (stable), was evaluated by endocrinology, laboratory workup was unremarkable.  No further follow-up recommended

## 2025-03-30 ENCOUNTER — RESULTS FOLLOW-UP (OUTPATIENT)
Dept: FAMILY MEDICINE CLINIC | Facility: CLINIC | Age: 45
End: 2025-03-30

## 2025-04-30 ENCOUNTER — TELEMEDICINE (OUTPATIENT)
Dept: FAMILY MEDICINE CLINIC | Facility: CLINIC | Age: 45
End: 2025-04-30
Payer: COMMERCIAL

## 2025-04-30 DIAGNOSIS — R06.81 APNEIC EPISODE: ICD-10-CM

## 2025-04-30 DIAGNOSIS — D50.8 OTHER IRON DEFICIENCY ANEMIA: ICD-10-CM

## 2025-04-30 DIAGNOSIS — G43.E11 INTRACTABLE CHRONIC MIGRAINE WITH AURA WITH STATUS MIGRAINOSUS: ICD-10-CM

## 2025-04-30 DIAGNOSIS — R06.83 SNORING: ICD-10-CM

## 2025-04-30 DIAGNOSIS — I10 PRIMARY HYPERTENSION: Primary | ICD-10-CM

## 2025-04-30 PROCEDURE — 99214 OFFICE O/P EST MOD 30 MIN: CPT | Performed by: FAMILY MEDICINE

## 2025-04-30 RX ORDER — PROPRANOLOL HYDROCHLORIDE 60 MG/1
60 CAPSULE, EXTENDED RELEASE ORAL DAILY
Qty: 30 CAPSULE | Refills: 0 | Status: SHIPPED | OUTPATIENT
Start: 2025-04-30 | End: 2025-05-30

## 2025-04-30 NOTE — PROGRESS NOTES
Joanne May, was evaluated through a synchronous (real-time) audio-video encounter. The patient (or guardian if applicable) is aware that this is a billable service, which includes applicable co-pays. This Virtual Visit was conducted with patient's (and/or legal guardian's) consent. Patient identification was verified, and a caregiver was present when appropriate.   The patient was located at Home: [address] [facility] [facility] [address]  Provider was located at Facility (Appt Dept): [address] [place] [address]  Confirm you are appropriately licensed, registered, or certified to deliver care in the state where the patient is located as indicated above. If you are not or unsure, please re-schedule the visit: Yes, I confirm.     Joanne May (:  1980) is a Established patient, presenting virtually for evaluation of the following:      Below is the assessment and plan developed based on review of pertinent history, physical exam, labs, studies, and medications.  Assessment & Plan  Intractable migraine  - Continuous migraines lasting >3 days  - BP well-regulated; currently on Ubrelvy PRN and prophylactic propranolol  - Initiate amitriptyline 25 mg QHS to reduce migraine frequency  - Maintain headache diary documenting episodes, duration, and treatment efficacy  - Refill propranolol 60 mg QD for 1 month    Anxiety  - Exacerbated by migraines  - Propranolol may aid social anxiety; amitriptyline QHS may help manage anxiety  - Consider Wellbutrin if anxiety severely impacts functioning    Iron deficiency anemia  - Managed by oncologist and OB/GYN  - Infusion declined due to tolerance concerns; plan established    Snoring and apneic episodes  - Sleep study completed; results pending  - Follow up with sleep study lab for results and treatment recommendations    Follow-up  - Patient to follow up in 1 month    The patient (or guardian, if applicable) and other individuals in

## 2025-05-01 ENCOUNTER — TELEPHONE (OUTPATIENT)
Dept: NEUROLOGY | Age: 45
End: 2025-05-01

## 2025-05-21 ENCOUNTER — OFFICE VISIT (OUTPATIENT)
Dept: NEUROLOGY | Age: 45
End: 2025-05-21
Payer: COMMERCIAL

## 2025-05-21 VITALS
DIASTOLIC BLOOD PRESSURE: 83 MMHG | BODY MASS INDEX: 47.55 KG/M2 | WEIGHT: 277 LBS | SYSTOLIC BLOOD PRESSURE: 131 MMHG | OXYGEN SATURATION: 96 % | HEART RATE: 95 BPM

## 2025-05-21 DIAGNOSIS — G43.E11 INTRACTABLE CHRONIC MIGRAINE WITH AURA WITH STATUS MIGRAINOSUS: Primary | ICD-10-CM

## 2025-05-21 PROCEDURE — 3075F SYST BP GE 130 - 139MM HG: CPT | Performed by: NURSE PRACTITIONER

## 2025-05-21 PROCEDURE — 3079F DIAST BP 80-89 MM HG: CPT | Performed by: NURSE PRACTITIONER

## 2025-05-21 PROCEDURE — 99214 OFFICE O/P EST MOD 30 MIN: CPT | Performed by: NURSE PRACTITIONER

## 2025-05-21 RX ORDER — FREMANEZUMAB-VFRM 225 MG/1.5ML
225 INJECTION SUBCUTANEOUS
COMMUNITY
Start: 2025-04-30

## 2025-05-21 NOTE — PROGRESS NOTES
taking pain medicine lie down in a darkroom and drink glass of water.  Consider lifestyle modification including good sleep hygiene, routine medial schedules, regular exercise and managing triggers.  Keep a headache diary  to reveal triggers and possible patterns. Migraine rhoda is a great resource for this. It can be found on the [employer] store.   Triggers may be: Food, stress, perfumes, alcohol, or even chocolate.  Drink plenty of water and try to get 8 hours of sleep each night to reduce risk factors that may cause headaches.               Treatment options fully reviewed with patient.  Time includes pre-  and post- face-face time in records review, and preparation including available pertinent images and reports.          Acknowledgements obtained where needed.   [ *NOTE: parts of this note were produced using artificial speech recognition software, which may have inadvertent errors in the produced wordings. ]      [facility]

## 2025-05-22 ENCOUNTER — TELEPHONE (OUTPATIENT)
Dept: NEUROLOGY | Age: 45
End: 2025-05-22

## 2025-05-22 RX ORDER — ATOGEPANT 60 MG/1
TABLET ORAL
Qty: 30 TABLET | Refills: 5 | Status: SHIPPED | OUTPATIENT
Start: 2025-05-22

## 2025-05-22 NOTE — ASSESSMENT & PLAN NOTE
Patient with failure of Ajovy.  Will replace with Qulipta.  Discontinue Ubrelvy, ineffective.  Nurtec caused a rash.  Will trial Zavzpret nasal spray.  Directions for use are as follows: 1 spray into 1 nostril at onset of the migraine.  No more than 1 in 24 hours.    If Qulipta does not produce a response to her migraines I would consider Botox.

## 2025-06-03 ENCOUNTER — TELEPHONE (OUTPATIENT)
Dept: NEUROLOGY | Age: 45
End: 2025-06-03

## 2025-06-03 NOTE — TELEPHONE ENCOUNTER
LVM with patient letting her PA for [phone]  is still pending and to schedule follow up appt with Sienna in September.

## 2025-06-04 ENCOUNTER — PATIENT MESSAGE (OUTPATIENT)
Dept: NEUROLOGY | Age: 45
End: 2025-06-04

## 2025-06-06 RX ORDER — PROMETHAZINE HYDROCHLORIDE 25 MG/1
TABLET ORAL
Qty: 12 TABLET | Refills: 0 | Status: SHIPPED | OUTPATIENT
Start: 2025-06-06

## 2025-06-06 NOTE — TELEPHONE ENCOUNTER
Patient with worsening nausea.  She has intolerances to ondansetron.  Will send in a prescription for Phenergan.

## 2025-06-26 ENCOUNTER — TELEPHONE (OUTPATIENT)
Dept: NEUROLOGY | Age: 45
End: 2025-06-26

## 2025-06-26 NOTE — TELEPHONE ENCOUNTER
LVM to discuss Spheno Block oop cost. Patient's insurance does not cover services. Peer to Peer has been denied.  Oop cost with 40% self pay discount applied $ 141.60

## 2025-07-07 ENCOUNTER — PATIENT MESSAGE (OUTPATIENT)
Dept: NEUROLOGY | Age: 45
End: 2025-07-07

## 2025-07-17 ENCOUNTER — OFFICE VISIT (OUTPATIENT)
Dept: FAMILY MEDICINE CLINIC | Facility: CLINIC | Age: 45
End: 2025-07-17
Payer: COMMERCIAL

## 2025-07-17 VITALS
TEMPERATURE: 97.6 F | RESPIRATION RATE: 18 BRPM | DIASTOLIC BLOOD PRESSURE: 82 MMHG | BODY MASS INDEX: 48.32 KG/M2 | SYSTOLIC BLOOD PRESSURE: 124 MMHG | HEIGHT: 64 IN | WEIGHT: 283 LBS | HEART RATE: 84 BPM | OXYGEN SATURATION: 99 %

## 2025-07-17 DIAGNOSIS — I10 PRIMARY HYPERTENSION: Primary | ICD-10-CM

## 2025-07-17 DIAGNOSIS — E66.01 OBESITY, MORBID, BMI 40.0-49.9 (HCC): ICD-10-CM

## 2025-07-17 DIAGNOSIS — R06.81 APNEIC EPISODE: ICD-10-CM

## 2025-07-17 DIAGNOSIS — R06.83 SNORING: ICD-10-CM

## 2025-07-17 DIAGNOSIS — D50.8 OTHER IRON DEFICIENCY ANEMIA: ICD-10-CM

## 2025-07-17 DIAGNOSIS — G43.E11 INTRACTABLE CHRONIC MIGRAINE WITH AURA WITH STATUS MIGRAINOSUS: ICD-10-CM

## 2025-07-17 PROCEDURE — 3079F DIAST BP 80-89 MM HG: CPT | Performed by: FAMILY MEDICINE

## 2025-07-17 PROCEDURE — 99214 OFFICE O/P EST MOD 30 MIN: CPT | Performed by: FAMILY MEDICINE

## 2025-07-17 PROCEDURE — 3074F SYST BP LT 130 MM HG: CPT | Performed by: FAMILY MEDICINE

## 2025-07-17 SDOH — ECONOMIC STABILITY: FOOD INSECURITY: WITHIN THE PAST 12 MONTHS, THE FOOD YOU BOUGHT JUST DIDN'T LAST AND YOU DIDN'T HAVE MONEY TO GET MORE.: NEVER TRUE

## 2025-07-17 SDOH — ECONOMIC STABILITY: FOOD INSECURITY: WITHIN THE PAST 12 MONTHS, YOU WORRIED THAT YOUR FOOD WOULD RUN OUT BEFORE YOU GOT MONEY TO BUY MORE.: NEVER TRUE

## 2025-07-17 ASSESSMENT — PATIENT HEALTH QUESTIONNAIRE - PHQ9
SUM OF ALL RESPONSES TO PHQ QUESTIONS 1-9: 0
2. FEELING DOWN, DEPRESSED OR HOPELESS: NOT AT ALL
SUM OF ALL RESPONSES TO PHQ QUESTIONS 1-9: 0
1. LITTLE INTEREST OR PLEASURE IN DOING THINGS: NOT AT ALL

## 2025-07-17 NOTE — PROGRESS NOTES
Joanne May (:  1980) is a 45 y.o. female,Established patient, here for evaluation of the following chief complaint(s):  Migraine (Follow up) and Hypertension (Follow up)    Assessment & Plan  Migraines  - Managed with Qulipta and Zepbound 10 mg injections  - Amitriptyline 25 mg at night and propranolol also prescribed  - No sleep issues since starting medication  - Considering Botox treatment in a couple of weeks    Weight management  - Motivated to lose weight, trying to eat healthier  - Referral to weight management clinic for comprehensive support, including nutritional guidance and potential medication options  - Discussed GLP-1 medication cost, approximately $360/month  - Will consider GLP-1 based on insurance coverage and affordability    Iron deficiency  - Iron levels low, TIBC saturation 6%  - Under oncologist care  - Hesitant about iron infusions due to past adverse reactions to migraine infusions  - Will discuss further with oncologist next month    Snoring and apneic episodes  - Referral to sleep medicine made last year, sleep study not yet done  - Advised to contact insurance for coverage details and consider at-home sleep test if necessary  - If diagnosed with moderate to high TAY, Zepbound may be clinically indicated and covered by insurance    The patient (or guardian, if applicable) and other individuals in attendance with the patient were advised that Artificial Intelligence will be utilized during this visit to record, process the conversation to generate a clinical note, and support improvement of the AI technology. The patient (or guardian, if applicable) and other individuals in attendance at the appointment consented to the use of AI, including the recording.      Subjective   HPI  History of Present Illness  The patient is a 45-year-old female here for follow-up.    She continues to experience migraines despite recent adjustment to Qulipta by her neurologist. She is on

## 2025-07-17 NOTE — ASSESSMENT & PLAN NOTE
Orders:    [facility][note 3] Sleep Lab

## 2025-08-05 ENCOUNTER — TELEPHONE (OUTPATIENT)
Dept: NEUROLOGY | Age: 45
End: 2025-08-05

## 2025-09-03 ENCOUNTER — TELEPHONE (OUTPATIENT)
Dept: ONCOLOGY | Age: 45
End: 2025-09-03